# Patient Record
Sex: MALE | Employment: OTHER | URBAN - METROPOLITAN AREA
[De-identification: names, ages, dates, MRNs, and addresses within clinical notes are randomized per-mention and may not be internally consistent; named-entity substitution may affect disease eponyms.]

---

## 2017-06-22 ENCOUNTER — TELEPHONE (OUTPATIENT)
Dept: ORTHOPEDICS | Facility: CLINIC | Age: 52
End: 2017-06-22

## 2017-06-22 DIAGNOSIS — M21.962 DEFORMITY, FOOT, LEFT: Primary | ICD-10-CM

## 2017-06-22 NOTE — TELEPHONE ENCOUNTER
Erin lives in [place], has brother Jose who lives in [place] who is helping to schedule an appointment and possible surgery with Dr Castaneda.  Images were reviewed by Dr Castaneda and a surgery worksheet completed.  Jose asked that we provide an estimate of the cost for the surgery and all follow ups since it will be self pay.  Financial counselor was given a list of estimated orders, appts, surgery, and hospital days needed. Pt would like to schedule the clinic appt close to a surgery date for one travel time to the [place].

## 2017-07-17 ENCOUNTER — TELEPHONE (OUTPATIENT)
Dept: ORTHOPEDICS | Facility: CLINIC | Age: 52
End: 2017-07-17

## 2017-07-17 NOTE — TELEPHONE ENCOUNTER
[facility]:  Nursing Note  SITUATION                                                      Erin Manriquez is a 51 year old male whose brother called with confirmation of apt date/time.    BACKGROUND                                                        Patient is scheduled for surgery 10.26.17.    Date of last clinic appointment: N/A - patient is new to clinic    Does patient have a future appointment scheduled?  No      ASSESSMENT      Assessment not needed, apt date/time clarification    PLAN                                                      Nursing Interventions: Call transferred to RE RN.   RECOMMENDED DISPOSITION: None  Will comply with recommendation: N/A    If further questions/concerns or if symptoms do not improve, worsen or new symptoms develop, patient/family are advised to call [phone], option #3 to speak with a triage nurse, as soon as possible.    Radha Avendano

## 2017-10-23 ENCOUNTER — OFFICE VISIT (OUTPATIENT)
Dept: ORTHOPEDICS | Facility: CLINIC | Age: 52
End: 2017-10-23

## 2017-10-23 ENCOUNTER — ANESTHESIA EVENT (OUTPATIENT)
Dept: SURGERY | Facility: CLINIC | Age: 52
End: 2017-10-23

## 2017-10-23 VITALS — HEIGHT: 67 IN | BODY MASS INDEX: 33.59 KG/M2 | WEIGHT: 214 LBS

## 2017-10-23 DIAGNOSIS — G14 POST-POLIO SYNDROME (H): Primary | ICD-10-CM

## 2017-10-23 NOTE — LETTER
Date:October 24, 2017      Patient was self referred, no letter generated. Do not send.        [facility] Health Information

## 2017-10-23 NOTE — PROGRESS NOTES
"Orthopedic Surgery New Patient Clinic Visit    History of Present Illness:     History obtained from: Patient  Chief Complaint: Left foot pain/deformity  HPI: Erin Pardo is a 51 year old male who presents left foot pain and deformity since he was a child.  He reports having a history significant for polio, which has resulted in left lower extremity weakness.  His right lower extremity is normal otherwise.  He report pain with walking over the lateral foot, as well as progressive deformity to the left foot over the past several years. He has tried multiple orthotics and custom shoes, which he feels worsen his symptoms rather than help.  He notes increased pain with walking and activity.  He has had increasing difficulty with walking with the deformity and is here for evaluation of options.       Past Medical History: Polio as a child and left lower extremity weakness    Past Surgical History: Left calcaneal osteotomy, right knee epiphyseodesis    No Known Allergies     Social History     Social History     Marital status:      Spouse name: N/A     Number of children: N/A     Years of education: N/A     Occupational History     Not on file.     Social History Main Topics     Smoking status: Not on file     Smokeless tobacco: Never Used     Alcohol use Not on file     Drug use: Not on file     Sexual activity: Not on file     Other Topics Concern     Not on file     Social History Narrative     No narrative on file       family history is not on file.    Review of System:  a 14 point review of system is negative except as noted below    Physical Examination:     Vitals (last filed):   Ht 1.702 m (5' 7\")  Wt 97.1 kg (214 lb)  BMI 33.52 kg/m2    BMI:  Body mass index is 33.52 kg/(m^2).    Exam:  The patient is here today with with his wife    PSYCH: Alert and oriented, cooperative, pleasant, appropriate  EYES: normal symmetric extraocular movement  HEENT: normocephalic, atraumatic, oropharynx pink and " moist  NECK: no jvd  RESPIRATORY: breathing comfortably on room air, no labored breathing  CARDIAC: RRR  SKIN: warm and well perfused, no abnormalities noted  LYMPH: no lad to the left popliteal fossa  NEURO:   L LE: SILT in DP/SP/Tib/Saph/Ambreen distributions  VASC:   Palpable dp/pt pulse  MSK:  LLE: cavovarus deformity to the left foot  Callosity over the base of the 5th mt  Correction of the hindfoot with judd block test  Markedly plantarflexed 1st ray    4/5 EHL/TA/GSC  4/5 eversion/inversion  1/5 hamstring/quad      Laboratory / Ancillary Data:     Data Review:  No results found for: WBC, HGB, HCT, MCV, PLT, RDW  No results found for: GLUCOSE, GLUF, CREAT, BUN, NA, CO2, ANIONGAP  No results found for: INR      Imaging:   XR bilateral feet -   Evidence of prior calcaneal osteotomy of the left foot, profound cavovarus deformity of the left foot noted, apparent preservation of tibiotalar/subtalar joint space     Assessment / Plan:     Active Problems:    * No active hospital problems. *      51 year old male with cavovarus left foot.    -discussed with patient diagnosis and treatment modalities including activity modification, medication, bracing, and surgical corrrection.  Patient understands the risks of surgery including infection, damage to nerves and vessels, and requiement of revision surgery in the future and would like to proceed with surgery on Thursday.    Plan:    Plantar fascia release, split posterior tibial tendon transfer, triple arthrodesis with midfoot osteotomy    Patient seen and examined with Dr Castaneda who agrees with the above assessment and plan of care.      Signed:  Cody Sheehan  10/23/2017  3:58 PM      I agree with the resident physician and the plan outlined, and saw the patient.  Anshul Castaneda

## 2017-10-23 NOTE — MR AVS SNAPSHOT
After Visit Summary   10/23/2017    Erin Pardo    MRN: [id number]           Patient Information     Date Of Birth          1965        Visit Information        Provider Department      10/23/2017 1:00 PM Anshul Castaneda MD [facility]        Today's Diagnoses     Post-polio syndrome    -  1       Follow-ups after your visit        Your next 10 appointments already scheduled     Oct 24, 2017  9:30 AM CDT   (Arrive by 9:15 AM)   PAC EVALUATION with  Pac Edwin 2   [facility] Preoperative Assessment [place] ([facility])    [address] [place] [address]   [phone]            Oct 24, 2017 10:30 AM CDT   (Arrive by 10:15 AM)   PAC RN ASSESSMENT with  Pac Rn   [facility] Assessment [place] ([facility])    [address] [place] [address]   [phone]            Oct 24, 2017 11:00 AM CDT   (Arrive by 10:45 AM)   PAC Anesthesia Consult with  Pac Anesthesiologist   [facility] ([facility])    [address] [place] [address]   [phone]            Oct 26, 2017   Procedure with Anshul [place]eda MD   [facility], [place], Same Day Surgery (--)    [address] [facility] [address] [phone]            Nov 09, 2017 10:00 AM CST   (Arrive by 9:45 AM)   Post-Op with MERE Whittington CNP   [facility] ([facility])    [address] [place] [id number] [phone]            Dec 11, 2017  3:45 PM CST   (Arrive by 3:30 PM)   Return Visit with Anshul [place] MD   [facility] ([facility])    [address] [place] [id number] [phone]              Who to contact     Please call your clinic at [phone] to:    Ask questions about your  "health    Make or cancel appointments    Discuss your medicines    Learn about your test results    Speak to your doctor   If you have compliments or concerns about an experience at your clinic, or if you wish to file a complaint, please contact [facility] Patient Relations at [phone] or email us at [email or web address]         Additional Information About Your Visit        [employer] Information     [employer] is an electronic gateway that provides easy, online access to your medical records. With [employer], you can request a clinic appointment, read your test results, renew a prescription or communicate with your care team.     To sign up for [employer] visit the website at [email or web address]   You will be asked to enter the access code listed below, as well as some personal information. Please follow the directions to create your username and password.     Your access code is: [email or web address]  Expires: 2018  4:27 PM     Your access code will  in 90 days. If you need help or a new code, please contact your [facility] or call [phone] for assistance.        Care EveryWhere ID     This is your Care EveryWhere ID. This could be used by other organizations to access your [place] medical records  [id number]        Your Vitals Were     Height BMI (Body Mass Index)                1.702 m (5' 7\") 33.52 kg/m2           Blood Pressure from Last 3 Encounters:   No data found for BP    Weight from Last 3 Encounters:   10/23/17 97.1 kg (214 lb)              Today, you had the following     No orders found for display       Primary Care Provider    None Specified       No primary provider on file.        Equal Access to Services     [facility]: Etienne Champion, rodri eason, qamarlen fletcher . [facility] [phone].    ATENCIÓN: Si habla español, tiene a hollis disposición " servicios gratuitos de asistencia lingüística. Gutierrez cruz [phone].    We comply with applicable federal civil rights laws and [place] laws. We do not discriminate on the basis of race, color, national origin, age, disability, sex, sexual orientation, or gender identity.            Thank you!     Thank you for choosing [facility]  for your care. Our goal is always to provide you with excellent care. Hearing back from our patients is one way we can continue to improve our services. Please take a few minutes to complete the written survey that you may receive in the mail after your visit with us. Thank you!             Your Updated Medication List - Protect others around you: Learn how to safely use, store and throw away your medicines at www.disposemymeds.org.      Notice  As of 10/23/2017  4:27 PM    You have not been prescribed any medications.

## 2017-10-23 NOTE — LETTER
"10/23/2017       RE: Erin Pardo  [address] [facility] [address]     Dear Colleague,    Thank you for referring your patient, Erin Pardo, to the [facility] ORTHOP[facility]IC at [facility]. Please see a copy of my visit note below.    Orthopedic Surgery New Patient Clinic Visit    History of Present Illness:     History obtained from: Patient  Chief Complaint: Left foot pain/deformity  HPI: Erin Pardo is a 51 year old male who presents left foot pain and deformity since he was a child.  He reports having a history significant for polio, which has resulted in left lower extremity weakness.  His right lower extremity is normal otherwise.  He report pain with walking over the lateral foot, as well as progressive deformity to the left foot over the past several years. He has tried multiple orthotics and custom shoes, which he feels worsen his symptoms rather than help.  He notes increased pain with walking and activity.  He has had increasing difficulty with walking with the deformity and is here for evaluation of options.       Past Medical History: Polio as a child and left lower extremity weakness    Past Surgical History: Left calcaneal osteotomy, right knee epiphyseodesis    No Known Allergies     Social History     Social History     Marital status:      Spouse name: N/A     Number of children: N/A     Years of education: N/A     Occupational History     Not on file.     Social History Main Topics     Smoking status: Not on file     Smokeless tobacco: Never Used     Alcohol use Not on file     Drug use: Not on file     Sexual activity: Not on file     Other Topics Concern     Not on file     Social History Narrative     No narrative on file       family history is not on file.    Review of System:  a 14 point review of system is negative except as noted below    Physical Examination:     Vitals (last filed):   Ht 1.702 m (5' 7\")  Wt 97.1 kg " (214 lb)  BMI 33.52 kg/m2    BMI:  Body mass index is 33.52 kg/(m^2).    Exam:  The patient is here today with with his wife    PSYCH: Alert and oriented, cooperative, pleasant, appropriate  EYES: normal symmetric extraocular movement  HEENT: normocephalic, atraumatic, oropharynx pink and moist  NECK: no jvd  RESPIRATORY: breathing comfortably on room air, no labored breathing  CARDIAC: RRR  SKIN: warm and well perfused, no abnormalities noted  LYMPH: no lad to the left popliteal fossa  NEURO:   L LE: SILT in DP/SP/Tib/Saph/Ambreen distributions  VASC:   Palpable dp/pt pulse  MSK:  LLE: cavovarus deformity to the left foot  Callosity over the base of the 5th mt  Correction of the hindfoot with judd block test  Markedly plantarflexed 1st ray    4/5 EHL/TA/GSC  4/5 eversion/inversion  1/5 hamstring/quad      Laboratory / Ancillary Data:     Data Review:  No results found for: WBC, HGB, HCT, MCV, PLT, RDW  No results found for: GLUCOSE, GLUF, CREAT, BUN, NA, CO2, ANIONGAP  No results found for: INR      Imaging:   XR bilateral feet -   Evidence of prior calcaneal osteotomy of the left foot, profound cavovarus deformity of the left foot noted, apparent preservation of tibiotalar/subtalar joint space     Assessment / Plan:     Active Problems:    * No active hospital problems. *      51 year old male with cavovarus left foot.    -discussed with patient diagnosis and treatment modalities including activity modification, medication, bracing, and surgical corrrection.  Patient understands the risks of surgery including infection, damage to nerves and vessels, and requiement of revision surgery in the future and would like to proceed with surgery on Thursday.    Plan:    Plantar fascia release, split posterior tibial tendon transfer, triple arthrodesis with midfoot osteotomy    Patient seen and examined with Dr Castaneda who agrees with the above assessment and plan of care.      Signed:  Cody Sheehan  10/23/2017  3:58  PM      I agree with the resident physician and the plan outlined, and saw the patient.  Anshul Castaneda      Again, thank you for allowing me to participate in the care of your patient.      Sincerely,    Anshul Castaneda MD

## 2017-10-23 NOTE — NURSING NOTE
Reason For Visit:   Chief Complaint   Patient presents with     Musculoskeletal Problem     Pre op discussion for 10/26/17           Pain Assessment  Patient Currently in Pain: Denies (muscle soreness/fatigue from walking the last few days)

## 2017-10-24 ENCOUNTER — ALLIED HEALTH/NURSE VISIT (OUTPATIENT)
Dept: SURGERY | Facility: CLINIC | Age: 52
End: 2017-10-24

## 2017-10-24 ENCOUNTER — OFFICE VISIT (OUTPATIENT)
Dept: SURGERY | Facility: CLINIC | Age: 52
End: 2017-10-24

## 2017-10-24 VITALS
HEART RATE: 67 BPM | DIASTOLIC BLOOD PRESSURE: 96 MMHG | OXYGEN SATURATION: 97 % | BODY MASS INDEX: 33.89 KG/M2 | WEIGHT: 215.9 LBS | TEMPERATURE: 98 F | RESPIRATION RATE: 16 BRPM | SYSTOLIC BLOOD PRESSURE: 156 MMHG | HEIGHT: 67 IN

## 2017-10-24 DIAGNOSIS — M21.962 TIBIAL DEFORMITY, ACQUIRED, LEFT: Primary | ICD-10-CM

## 2017-10-24 DIAGNOSIS — M21.962 TIBIAL DEFORMITY, ACQUIRED, LEFT: ICD-10-CM

## 2017-10-24 DIAGNOSIS — I10 HYPERTENSION, UNSPECIFIED TYPE: ICD-10-CM

## 2017-10-24 LAB
ANION GAP SERPL CALCULATED.3IONS-SCNC: 4 MMOL/L (ref 3–14)
BUN SERPL-MCNC: 14 MG/DL (ref 7–30)
CALCIUM SERPL-MCNC: 8.7 MG/DL (ref 8.5–10.1)
CHLORIDE SERPL-SCNC: 105 MMOL/L (ref 94–109)
CO2 SERPL-SCNC: 30 MMOL/L (ref 20–32)
CREAT SERPL-MCNC: 0.71 MG/DL (ref 0.66–1.25)
ERYTHROCYTE [DISTWIDTH] IN BLOOD BY AUTOMATED COUNT: 13.2 % (ref 10–15)
GFR SERPL CREATININE-BSD FRML MDRD: >90 ML/MIN/1.7M2
GLUCOSE SERPL-MCNC: 122 MG/DL (ref 70–99)
HCT VFR BLD AUTO: 48.2 % (ref 40–53)
HGB BLD-MCNC: 15.7 G/DL (ref 13.3–17.7)
MCH RBC QN AUTO: 27.7 PG (ref 26.5–33)
MCHC RBC AUTO-ENTMCNC: 32.6 G/DL (ref 31.5–36.5)
MCV RBC AUTO: 85 FL (ref 78–100)
PLATELET # BLD AUTO: 212 10E9/L (ref 150–450)
POTASSIUM SERPL-SCNC: 4.5 MMOL/L (ref 3.4–5.3)
RBC # BLD AUTO: 5.66 10E12/L (ref 4.4–5.9)
SODIUM SERPL-SCNC: 139 MMOL/L (ref 133–144)
WBC # BLD AUTO: 7.1 10E9/L (ref 4–11)

## 2017-10-24 ASSESSMENT — LIFESTYLE VARIABLES: TOBACCO_USE: 0

## 2017-10-24 NOTE — PATIENT INSTRUCTIONS
Preparing for Your Surgery      Name:  Erin Pardo   MRN:  [id number]   :  1965   Today's Date:  10/24/2017     Arriving for surgery:  Surgery date:  10/26/17  Arrival time:  06:30  Please come to:     [facility] [address]  [address] [place]  [address]    - parking is available in front of [facility] from 5:15AM to 8:00PM. If you prefer, park your car in the Green Lot.    -Proceed to the 3rd floor, check in at the Adult Surgery Waiting Lounge. [phone]    If an escort is needed stop at the Information Desk in the lobby. Inform the information person that you are here for surgery. An escort to the Adult Surgery Waiting Lounge will be provided.        What can I eat or drink?  -  You may have solid food or milk products until 8 hours prior to your surgery.  -  You may have water, apple juice or 7up/Sprite until 2 hours prior to your surgery.    Which medicines can I take?  -  Do NOT take these medications in the morning, the day of surgery:  Aspirin and supplements x 7 days before surgery, ibuprofen x 2 days before surgery    -  Please take these medications the day of surgery:  Tylenol if needed    How do I prepare myself?  -  Take two showers: one the night before surgery; and one the morning of surgery.         Use Scrubcare or Hibiclens to wash from neck down.  You may use your own shampoo and conditioner. No other hair products.   -  Do NOT use lotion, powder, deodorant, or antiperspirant the day of your surgery.  -  Do NOT wear any makeup, fingernail polish or jewelry.  -  Begin using Incentive Spirometer 1 week prior to surgery.  Use 4 times per day, up to 5 breaths each time.  Bring Incentive Spirometer to hospital.  -Do not bring your own medications to the hospital, except for inhalers and eye drops.  -  Bring your ID and insurance card.    Questions or Concerns:  If you have questions or concerns, please call  the  Preoperative Assessment Center, Monday-Friday 7AM-7PM:  [phone]

## 2017-10-24 NOTE — ANESTHESIA PREPROCEDURE EVALUATION
"  Anesthesia Evaluation     . Pt has had prior anesthetic. Type: General    No history of anesthetic complications          ROS/MED HX    ENT/Pulmonary:     (+)ADAM risk factors snores loudly, , . .   (-) tobacco use   Neurologic:  - neg neurologic ROS     Cardiovascular:  - neg cardiovascular ROS   (+) hypertension-range: \"borderline\", ---. : . . . :. . No previous cardiac testing      (-) taking anticoagulants/antiplatelets   METS/Exercise Tolerance: Comment: Snorkeling, diving, hunting and fishing.  Limited walking by left foot pain/movement >4 METS   Hematologic:  - neg hematologic  ROS       Musculoskeletal:   (+) , , other musculoskeletal (hx of polio.  LLE pain and deformity 2* polio)-       GI/Hepatic:  - neg GI/hepatic ROS      (-) GERD   Renal/Genitourinary:  - ROS Renal section negative       Endo:  - neg endo ROS   (+) Obesity, .      Psychiatric:  - neg psychiatric ROS       Infectious Disease:  - neg infectious disease ROS       Malignancy:      - no malignancy   Other:    (+) no H/O Chronic Pain,                   Physical Exam      Airway   Mallampati: II  TM distance: >3 FB  Neck ROM: full    Dental   Comment: Permanent bridge lower    Cardiovascular   Rhythm and rate: regular and normal      Pulmonary    breath sounds clear to auscultation    Other findings: LLE muscle atrophy      10/24/2017 10:50  Sodium: 139  Potassium: 4.5  Chloride: 105  Carbon Dioxide: 30  Urea Nitrogen: 14  Creatinine: 0.71  GFR Estimate: >90  GFR Estimate If Black: >90  Calcium: 8.7  Anion Gap: 4    Glucose: 122 (H)    WBC: 7.1  Hemoglobin: 15.7  Hematocrit: 48.2  Platelet Count: 212  RBC Count: 5.66  MCV: 85  MCH: 27.7  MCHC: 32.6  RDW: 13.2           PAC Discussion and Assessment    ASA Classification: 2  Case is suitable for: [place]  Anesthetic techniques and relevant risks discussed: Regional  Invasive monitoring and risk discussed: No  Types:   Possibility and Risk of blood transfusion discussed: " No  NPO instructions given:   Additional anesthetic preparation and risks discussed:   Needs early admission to pre-op area:   Other:     PAC Resident/NP Anesthesia Assessment:  Scheduled for Left Tibia Internal Fixation, Left Triple Arthrodesis, Left Posterior Tibia Tendon Transfer, Left Achilles Lengthening on 10/26/17 by Dr. Castaneda in treatment of post polio LLE pain/deformity.  PAC referral for risk assessment and optimization for anesthesia with comorbid conditions of:    Erin Pardo is a 51 year old male with left foot pain and deformity since he was a child.  He reports having a history significant for polio, which has resulted in left lower extremity weakness.  His right lower extremity is normal otherwise.     Pre-operative considerations:  1.  Cardiac:  Functional status very good.  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  0.4%  risk of major adverse cardiac event.    2.  Pulm:  Airway feasible.  ADAM risk:  intermediate  3.  GI:  Risk of PONV score = 2.  If 3 or > anti-emetic intervention recommended (with 2 or more meds).   4.  History of Polio with LLE deformity/atrophy  5.  ID:  Travelled from [facility].  No s/s of MERS.        Patient is optimized and is acceptable candidate for the proposed procedure.  No further diagnostic evaluation is needed.     Patient also evaluated by Dr. Turner. See recommendations below.           Reviewed and Signed by PAC Mid-Level Provider/Resident  Mid-Level Provider/Resident: Shelby Rajan PA-C  Date: 10/24/17  Time: 0940    Attending Anesthesiologist Anesthesia Assessment:  I saw the patient and discussed with the LINDA as above.  Patient currently medically optimized for the proposed procedure.   Final anesthetic plan and recommendations to be made by the attending anesthesiologist on the day of surgery.       Reviewed and Signed by PAC Anesthesiologist  Anesthesiologist: VERONICA  Date: 10/24/17  Time:   Pass/Fail: Pass  Disposition:     PAC Pharmacist  Assessment:        Pharmacist:   Date:   Time:      Anesthesia Plan      History & Physical Review  History and physical reviewed and following examination; no interval change.    ASA Status:  3 .    NPO Status:  > 8 hours    Plan for Spinal and Periph. Nerve Block for postop pain with Propofol induction.   PONV prophylaxis:  Ondansetron (or other 5HT-3)  Popliteal block for post op pain  Spinal for the case        Postoperative Care  Postoperative pain management:  Multi-modal analgesia, Peripheral nerve block (Single Shot) and Neuraxial analgesia.      Consents  Anesthetic plan, risks, benefits and alternatives discussed with:  Patient.  Use of blood products discussed: Yes.   Use of blood products discussed with Patient.  Consented to blood products.  .                          .

## 2017-10-24 NOTE — H&P
Pre-Operative H & P     CC:  Preoperative exam to assess for increased cardiopulmonary risk while undergoing surgery and anesthesia.    Date of Encounter: 10/24/2017  Primary Care Physician:  No primary care provider on file.  (in [place])    CHESTER Pardo is a 51 year old male who presents for pre-operative H & P in preparation for  Left Tibia Internal Fixation, Left Triple Arthrodesis, Left Posterior Tibia Tendon Transfer, Left Achilles Lengthening on 10/26/17 by Dr. Castaneda in treatment of post polio LLE pain/deformity.  Surgery at [facility].     History of polio as a child.  Has left foot pain and deformity that limits activity.  He reports weight gain due to inactivity.  No personal or family history of bleeding, clotting disorders or complications with anesthesia.    He lives and works in [place].  No s/s of MERS.     History is obtained from the patient and electronic health record.     Past Medical History  Past Medical History:   Diagnosis Date     Obesity (BMI 30.0-34.9)      Post-polio limb muscle weakness     left       Past Surgical History  Past Surgical History:   Procedure Laterality Date     OSTEOTOMY ANKLE  1978    and right knee at [facility]       Hx of Blood transfusions/reactions: no     Hx of abnormal bleeding or anti-platelet use: no    Steroid use in the last year: no    Personal or FH with difficulty with Anesthesia:  no    Prior to Admission Medications  No current outpatient prescriptions on file.       Allergies  No Known Allergies    Social History  Social History     Social History     Marital status:      Spouse name: N/A     Number of children: N/A     Years of education: N/A     Occupational History     Not on file.     Social History Main Topics     Smoking status: Never Smoker     Smokeless tobacco: Never Used     Alcohol use No     Drug use: No     Sexual activity: Not on file     Other Topics Concern  "    Not on file     Social History Narrative    .  Lives and works in [place] [facility].    In Marine business - dives and fishes    5 children - healthy    18 siblings (some 1/2 siblings)    + FH of cancer in father's side.        Family History  See above.       ROS/MED HX  The complete review of systems is negative other than noted in the HPI or here.     ENT/Pulmonary:     (+)ADAM risk factors snores loudly, , . .   (-) tobacco use   Neurologic:  - neg neurologic ROS     Cardiovascular:  - neg cardiovascular ROS   (+) hypertension-range: \"borderline\", ---. : . . . :. . No previous cardiac testing      (-) taking anticoagulants/antiplatelets   METS/Exercise Tolerance: Comment: Snorkeling, diving, hunting and fishing.  Limited walking by left foot pain >4 METS   Hematologic:  - neg hematologic  ROS       Musculoskeletal:   (+) , , other musculoskeletal (hx of polio.  LLE pain and deformity 2* polio)-       GI/Hepatic:  - neg GI/hepatic ROS      (-) GERD   Renal/Genitourinary:  - ROS Renal section negative       Endo:  - neg endo ROS   (+) Obesity, .      Psychiatric:  - neg psychiatric ROS       Infectious Disease:  - neg infectious disease ROS       Malignancy:      - no malignancy   Other:    (+) no H/O Chronic Pain,       Temp: 98  F (36.7  C) Temp src: Oral BP: (!) 156/96 Pulse: 67   Resp: 16 SpO2: 97 %         215 lbs 14.4 oz  5' 7\"   Body mass index is 33.81 kg/(m^2).       Physical Exam  Constitutional: Awake, alert, cooperative, no apparent distress, and appears stated age.  Eyes: Pupils equal, round and reactive to light, sclera clear, conjunctiva normal.  HENT: Normocephalic, oral pharynx with moist mucus membranes, good dentition. No goiter appreciated.   Respiratory: Clear to auscultation bilaterally, no crackles or wheezing.  Cardiovascular: Regular rate and rhythm, normal S1 and S2, and no murmur noted.  Carotids +2, no bruits. No edema. Palpable pulses to  DP and PT arteries.   GI: Normal bowel " sounds, soft, non-distended, non-tender, no masses palpated, no hepatosplenomegaly.    Lymph/Hematologic: No cervical lymphadenopathy and no supraclavicular lymphadenopathy.  Skin: Warm and dry.  No rashes at anticipated surgical site.   Musculoskeletal: Full ROM of neck. There is no redness, warmth, or swelling of the joints. Gross motor strength is normal.    Neurologic: Awake, alert, oriented to name, place and time. Cranial nerves II-XII are grossly intact. Gait is normal.   Neuropsychiatric: Calm, cooperative. Normal affect.     Labs: (personally reviewed)      10/24/2017 10:50  Sodium: 139  Potassium: 4.5  Chloride: 105  Carbon Dioxide: 30  Urea Nitrogen: 14  Creatinine: 0.71  GFR Estimate: >90  GFR Estimate If Black: >90  Calcium: 8.7  Anion Gap: 4    Glucose: 122 (H)    WBC: 7.1  Hemoglobin: 15.7  Hematocrit: 48.2  Platelet Count: 212  RBC Count: 5.66  MCV: 85  MCH: 27.7  MCHC: 32.6  RDW: 13.2      Outside records reviewed from: NA    ASSESSMENT and PLAN  Erin Pardo is a 51 year old male scheduled to undergo  Left Tibia Internal Fixation, Left Triple Arthrodesis, Left Posterior Tibia Tendon Transfer, Left Achilles Lengthening on 10/26/17 by Dr. Castaneda in treatment of post polio LLE pain/deformity.  PAC referral for risk assessment and optimization for anesthesia with comorbid conditions of:    Erin Pardo is a 51 year old male with left foot pain and deformity since he was a child.  He reports having a history significant for polio, which has resulted in left lower extremity weakness.  His right lower extremity is normal otherwise.     Pre-operative considerations:  1.  Cardiac:  Functional status very good.  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  0.4%  risk of major adverse cardiac event.    2.  Pulm:  Airway feasible.  ADAM risk:  Intermediate. Non smoker.   3.  GI:  Risk of PONV score = 2.  If 3 or > anti-emetic intervention recommended (with 2 or more meds).   4.  History of Polio with LLE  deformity/atrophy  5.  ID:  Travelled from [place].  No s/s of MERS.    6.  Obese - BMI 34.  Has been counseled on diet and exercise.      Labs ordered:   CBC, BMP  For complete medication and diet instructions for surgery, please refer to the AVS completed by nursing.   Patient is optimized and is acceptable candidate for the proposed procedure.  No further diagnostic evaluation is needed.  Patient was discussed with Dr Turner.    Shelby Rajan PA-C  Preoperative Assessment Center  [facility]  Clinic and Surgery Center  Phone: [phone]  Fax: [phone]

## 2017-10-24 NOTE — MR AVS SNAPSHOT
After Visit Summary   10/24/2017    Erin Pardo    MRN: [id number]           Patient Information     Date Of Birth          1965        Visit Information        Provider Department      10/24/2017 10:30 AM Rn, [facility]        Care Instructions    Preparing for Your Surgery      Name:  Erin Pardo   MRN:  [id number]   :  1965   Today's Date:  10/24/2017     Arriving for surgery:  Surgery date:  10/26/17  Arrival time:  06:30  Please come to:     [facility] [address]  [address] [place], [place] [address]  [address]    - parking is available in front of [facility] from 5:15AM to 8:00PM. If you prefer, park your car in the Green Lot.    -Proceed to the 3rd floor, check in at the Adult Surgery Waiting Lounge. [phone]    If an escort is needed stop at the Information Desk in the lobby. Inform the information person that you are here for surgery. An escort to the Adult Surgery Waiting Lounge will be provided.        What can I eat or drink?  -  You may have solid food or milk products until 8 hours prior to your surgery.  -  You may have water, apple juice or 7up/Sprite until 2 hours prior to your surgery.    Which medicines can I take?  -  Do NOT take these medications in the morning, the day of surgery:  Aspirin and supplements x 7 days before surgery, ibuprofen x 2 days before surgery    -  Please take these medications the day of surgery:  Tylenol if needed    How do I prepare myself?  -  Take two showers: one the night before surgery; and one the morning of surgery.         Use Scrubcare or Hibiclens to wash from neck down.  You may use your own shampoo and conditioner. No other hair products.   -  Do NOT use lotion, powder, deodorant, or antiperspirant the day of your surgery.  -  Do NOT wear any makeup, fingernail polish or jewelry.  -  Begin using Incentive Spirometer 1 week prior to  surgery.  Use 4 times per day, up to 5 breaths each time.  Bring Incentive Spirometer to hospital.  -Do not bring your own medications to the hospital, except for inhalers and eye drops.  -  Bring your ID and insurance card.    Questions or Concerns:  If you have questions or concerns, please call the  Preoperative Assessment Center, Monday-Friday 7AM-7PM:  [phone]                    Follow-ups after your visit        Your next 10 appointments already scheduled     Oct 24, 2017 10:30 AM CDT   (Arrive by 10:15 AM)   PAC RN ASSESSMENT with  Pac Rn   [facility] Preoperative [facility] ([facility])    [address] [place] [address]  4th [facility] [address] [phone]            Oct 24, 2017 11:00 AM CDT   (Arrive by 10:45 AM)   PAC Anesthesia Consult with  Pac Anesthesiologist   [facility] Preoperative Assessment [place] ([facility])    [address] [place] [address]  4th [facility] [id number] [phone]            Oct 24, 2017 11:15 AM CDT   LAB with [facility]   [facility] ([facility])    [address] [place] [id number] [phone]           Patient must bring picture ID. Patient should be prepared to give a urine specimen  Please do not eat 10-12 hours before your appointment if you are coming in fasting for labs on lipids, cholesterol, or glucose (sugar). Pregnant women should follow their Care Team instructions. Water with medications is okay. Do not drink coffee or other fluids. If you have concerns about taking  your medications, please ask at office or if scheduling via [employer], send a message by clicking on Secure Messaging, Message Your Care Team.            Oct 26, 2017   Procedure with Anshul Castaneda MD   [facility], [place], Same Day Surgery (--[facility]    [address] [facility] [place] [id number] [phone]            Nov 09, 2017 10:00 AM CST   (Arrive by 9:45 AM)   Post-Op with Kenia ROMAN  Vaske, APRN CNP   [facility] Orthopaedic [facility] ([facility])    [address] [facility]  4th [facility] [address] [phone]            Dec 11, 2017  3:45 PM CST   (Arrive by 3:30 PM)   Return Visit with Anshul Castaneda MD   [facility] ([facility])    [address]  4th [facility] [address] [phone]              Who to contact     Please call your clinic at [phone] to:    Ask questions about your health    Make or cancel appointments    Discuss your medicines    Learn about your test results    Speak to your doctor   If you have compliments or concerns about an experience at your clinic, or if you wish to file a complaint, please contact [facility] Patient Relations at [phone] or email us at [email or web address]         Additional Information About Your Visit        MyChart Information     Zero9t is an electronic gateway that provides easy, online access to your medical records. With Selphee, you can request a clinic appointment, read your test results, renew a prescription or communicate with your care team.     To sign up for Selphee visit the website at [email or web address]   You will be asked to enter the access code listed below, as well as some personal information. Please follow the directions to create your username and password.     Your access code is: [email or web address]  Expires: 2018  4:27 PM     Your access code will  in 90 days. If you need help or a new code, please contact your [facility] or call [phone] for assistance.        Care EveryWhere ID     This is your Care EveryWhere ID. This could be used by other organizations to access your [facility] medical records  [id number]         Blood Pressure from Last 3 Encounters:   10/24/17 (!) 156/96    Weight from Last 3 Encounters:   10/24/17 97.9 kg (215  lb 14.4 oz)   10/23/17 97.1 kg (214 lb)              Today, you had the following     No orders found for display       Primary Care Provider    None Specified       No primary provider on file.        Equal Access to Services     BLADE VILLANUEVA : Hadii aad ku hadmoojustine Champion, michelleda bhaktigregoriaha, obduliata kamida renee, marlen mimi navcatracho ericmaríabrie connelly. So [facility] [phone].    ATENCIÓN: Si habla español, tiene a hollis disposición servicios gratuitos de asistencia lingüística. Llame al [phone].    We comply with applicable federal civil rights laws and [place] laws. We do not discriminate on the basis of race, color, national origin, age, disability, sex, sexual orientation, or gender identity.            Thank you!     Thank you for choosing [facility] PREOPERATIVE [facility]  for your care. Our goal is always to provide you with excellent care. Hearing back from our patients is one way we can continue to improve our services. Please take a few minutes to complete the written survey that you may receive in the mail after your visit with us. Thank you!             Your Updated Medication List - Protect others around you: Learn how to safely use, store and throw away your medicines at www.disposemymeds.org.      Notice  As of 10/24/2017  9:53 AM    You have not been prescribed any medications.

## 2017-10-26 ENCOUNTER — APPOINTMENT (OUTPATIENT)
Dept: GENERAL RADIOLOGY | Facility: CLINIC | Age: 52
End: 2017-10-26
Attending: ORTHOPAEDIC SURGERY

## 2017-10-26 ENCOUNTER — HOSPITAL ENCOUNTER (OUTPATIENT)
Facility: CLINIC | Age: 52
Discharge: HOME | End: 2017-10-27
Attending: ORTHOPAEDIC SURGERY | Admitting: ORTHOPAEDIC SURGERY

## 2017-10-26 ENCOUNTER — ANESTHESIA (OUTPATIENT)
Dept: SURGERY | Facility: CLINIC | Age: 52
End: 2017-10-26

## 2017-10-26 DIAGNOSIS — Z98.890 STATUS POST OSTEOTOMY: Primary | ICD-10-CM

## 2017-10-26 LAB — GLUCOSE BLDC GLUCOMTR-MCNC: 125 MG/DL (ref 70–99)

## 2017-10-26 PROCEDURE — 36000064 ZZH SURGERY LEVEL 4 EA 15 ADDTL MIN - UMMC: Performed by: ORTHOPAEDIC SURGERY

## 2017-10-26 PROCEDURE — 40000986 XR ANKLE PORT LT 2 VW: Mod: LT

## 2017-10-26 PROCEDURE — C9290 INJ, BUPIVACAINE LIPOSOME: HCPCS | Performed by: ANESTHESIOLOGY

## 2017-10-26 PROCEDURE — 25000128 H RX IP 250 OP 636: Performed by: ANESTHESIOLOGY

## 2017-10-26 PROCEDURE — 37000008 ZZH ANESTHESIA TECHNICAL FEE, 1ST 30 MIN: Performed by: ORTHOPAEDIC SURGERY

## 2017-10-26 PROCEDURE — 25000132 ZZH RX MED GY IP 250 OP 250 PS 637: Performed by: ANESTHESIOLOGY

## 2017-10-26 PROCEDURE — 25000128 H RX IP 250 OP 636: Performed by: ORTHOPAEDIC SURGERY

## 2017-10-26 PROCEDURE — 40000278 XR SURGERY CARM FLUORO LESS THAN 5 MIN: Mod: TC

## 2017-10-26 PROCEDURE — 40000170 ZZH STATISTIC PRE-PROCEDURE ASSESSMENT II: Performed by: ORTHOPAEDIC SURGERY

## 2017-10-26 PROCEDURE — 71000015 ZZH RECOVERY PHASE 1 LEVEL 2 EA ADDTL HR: Performed by: ORTHOPAEDIC SURGERY

## 2017-10-26 PROCEDURE — 25000125 ZZHC RX 250: Performed by: NURSE ANESTHETIST, CERTIFIED REGISTERED

## 2017-10-26 PROCEDURE — C1713 ANCHOR/SCREW BN/BN,TIS/BN: HCPCS | Performed by: ORTHOPAEDIC SURGERY

## 2017-10-26 PROCEDURE — 71000014 ZZH RECOVERY PHASE 1 LEVEL 2 FIRST HR: Performed by: ORTHOPAEDIC SURGERY

## 2017-10-26 PROCEDURE — 99213 OFFICE O/P EST LOW 20 MIN: CPT | Performed by: INTERNAL MEDICINE

## 2017-10-26 PROCEDURE — 25000128 H RX IP 250 OP 636: Performed by: NURSE ANESTHETIST, CERTIFIED REGISTERED

## 2017-10-26 PROCEDURE — 25000125 ZZHC RX 250: Performed by: ANESTHESIOLOGY

## 2017-10-26 PROCEDURE — C1769 GUIDE WIRE: HCPCS | Performed by: ORTHOPAEDIC SURGERY

## 2017-10-26 PROCEDURE — 27210794 ZZH OR GENERAL SUPPLY STERILE: Performed by: ORTHOPAEDIC SURGERY

## 2017-10-26 PROCEDURE — 37000009 ZZH ANESTHESIA TECHNICAL FEE, EACH ADDTL 15 MIN: Performed by: ORTHOPAEDIC SURGERY

## 2017-10-26 PROCEDURE — 25000132 ZZH RX MED GY IP 250 OP 250 PS 637: Performed by: ORTHOPAEDIC SURGERY

## 2017-10-26 PROCEDURE — 12000001 ZZH R&B MED SURG/OB UMMC

## 2017-10-26 PROCEDURE — 36000066 ZZH SURGERY LEVEL 4 W FLUORO 1ST 30 MIN - UMMC: Performed by: ORTHOPAEDIC SURGERY

## 2017-10-26 PROCEDURE — 40000986 XR FOOT PORT LT 2 VW: Mod: LT

## 2017-10-26 PROCEDURE — 00000146 ZZHCL STATISTIC GLUCOSE BY METER IP

## 2017-10-26 DEVICE — IMPLANTABLE DEVICE: Type: IMPLANTABLE DEVICE | Site: FOOT | Status: FUNCTIONAL

## 2017-10-26 RX ORDER — DIPHENHYDRAMINE HCL 25 MG
25 CAPSULE ORAL EVERY 6 HOURS PRN
Status: DISCONTINUED | OUTPATIENT
Start: 2017-10-26 | End: 2017-10-27 | Stop reason: HOSPADM

## 2017-10-26 RX ORDER — FENTANYL CITRATE 50 UG/ML
25-50 INJECTION, SOLUTION INTRAMUSCULAR; INTRAVENOUS EVERY 5 MIN PRN
Status: DISCONTINUED | OUTPATIENT
Start: 2017-10-26 | End: 2017-10-26

## 2017-10-26 RX ORDER — FENTANYL CITRATE 50 UG/ML
INJECTION, SOLUTION INTRAMUSCULAR; INTRAVENOUS PRN
Status: DISCONTINUED | OUTPATIENT
Start: 2017-10-26 | End: 2017-10-26

## 2017-10-26 RX ORDER — ACETAMINOPHEN 325 MG/1
650 TABLET ORAL EVERY 4 HOURS PRN
Status: DISCONTINUED | OUTPATIENT
Start: 2017-10-29 | End: 2017-10-27 | Stop reason: HOSPADM

## 2017-10-26 RX ORDER — CEFAZOLIN SODIUM 2 G/100ML
2 INJECTION, SOLUTION INTRAVENOUS
Status: COMPLETED | OUTPATIENT
Start: 2017-10-26 | End: 2017-10-26

## 2017-10-26 RX ORDER — ONDANSETRON 4 MG/1
4 TABLET, ORALLY DISINTEGRATING ORAL EVERY 30 MIN PRN
Status: DISCONTINUED | OUTPATIENT
Start: 2017-10-26 | End: 2017-10-26

## 2017-10-26 RX ORDER — OXYCODONE HYDROCHLORIDE 5 MG/1
5-10 TABLET ORAL
Status: DISCONTINUED | OUTPATIENT
Start: 2017-10-26 | End: 2017-10-27 | Stop reason: HOSPADM

## 2017-10-26 RX ORDER — BUPIVACAINE HYDROCHLORIDE AND EPINEPHRINE 2.5; 5 MG/ML; UG/ML
INJECTION, SOLUTION INFILTRATION; PERINEURAL PRN
Status: DISCONTINUED | OUTPATIENT
Start: 2017-10-26 | End: 2017-10-26

## 2017-10-26 RX ORDER — PROPOFOL 10 MG/ML
INJECTION, EMULSION INTRAVENOUS CONTINUOUS PRN
Status: DISCONTINUED | OUTPATIENT
Start: 2017-10-26 | End: 2017-10-26

## 2017-10-26 RX ORDER — AMOXICILLIN 250 MG
1-2 CAPSULE ORAL 2 TIMES DAILY
Status: DISCONTINUED | OUTPATIENT
Start: 2017-10-26 | End: 2017-10-27 | Stop reason: HOSPADM

## 2017-10-26 RX ORDER — ONDANSETRON 2 MG/ML
INJECTION INTRAMUSCULAR; INTRAVENOUS PRN
Status: DISCONTINUED | OUTPATIENT
Start: 2017-10-26 | End: 2017-10-26

## 2017-10-26 RX ORDER — FLUMAZENIL 0.1 MG/ML
0.2 INJECTION, SOLUTION INTRAVENOUS
Status: DISCONTINUED | OUTPATIENT
Start: 2017-10-26 | End: 2017-10-26

## 2017-10-26 RX ORDER — SODIUM CHLORIDE, SODIUM LACTATE, POTASSIUM CHLORIDE, CALCIUM CHLORIDE 600; 310; 30; 20 MG/100ML; MG/100ML; MG/100ML; MG/100ML
INJECTION, SOLUTION INTRAVENOUS CONTINUOUS
Status: DISCONTINUED | OUTPATIENT
Start: 2017-10-26 | End: 2017-10-27 | Stop reason: HOSPADM

## 2017-10-26 RX ORDER — HYDROMORPHONE HYDROCHLORIDE 1 MG/ML
.3-.5 INJECTION, SOLUTION INTRAMUSCULAR; INTRAVENOUS; SUBCUTANEOUS
Status: DISCONTINUED | OUTPATIENT
Start: 2017-10-26 | End: 2017-10-27 | Stop reason: HOSPADM

## 2017-10-26 RX ORDER — SODIUM CHLORIDE, SODIUM LACTATE, POTASSIUM CHLORIDE, CALCIUM CHLORIDE 600; 310; 30; 20 MG/100ML; MG/100ML; MG/100ML; MG/100ML
INJECTION, SOLUTION INTRAVENOUS CONTINUOUS PRN
Status: DISCONTINUED | OUTPATIENT
Start: 2017-10-26 | End: 2017-10-26

## 2017-10-26 RX ORDER — ACETAMINOPHEN 325 MG/1
975 TABLET ORAL ONCE
Status: COMPLETED | OUTPATIENT
Start: 2017-10-26 | End: 2017-10-26

## 2017-10-26 RX ORDER — GABAPENTIN 300 MG/1
300 CAPSULE ORAL ONCE
Status: COMPLETED | OUTPATIENT
Start: 2017-10-26 | End: 2017-10-26

## 2017-10-26 RX ORDER — CYCLOBENZAPRINE HCL 5 MG
10 TABLET ORAL 3 TIMES DAILY PRN
Status: DISCONTINUED | OUTPATIENT
Start: 2017-10-26 | End: 2017-10-27 | Stop reason: HOSPADM

## 2017-10-26 RX ORDER — EPHEDRINE SULFATE 50 MG/ML
INJECTION, SOLUTION INTRAVENOUS PRN
Status: DISCONTINUED | OUTPATIENT
Start: 2017-10-26 | End: 2017-10-26

## 2017-10-26 RX ORDER — GLYCOPYRROLATE 0.2 MG/ML
INJECTION, SOLUTION INTRAMUSCULAR; INTRAVENOUS PRN
Status: DISCONTINUED | OUTPATIENT
Start: 2017-10-26 | End: 2017-10-26

## 2017-10-26 RX ORDER — NALOXONE HYDROCHLORIDE 0.4 MG/ML
.1-.4 INJECTION, SOLUTION INTRAMUSCULAR; INTRAVENOUS; SUBCUTANEOUS
Status: DISCONTINUED | OUTPATIENT
Start: 2017-10-26 | End: 2017-10-27 | Stop reason: HOSPADM

## 2017-10-26 RX ORDER — NALOXONE HYDROCHLORIDE 0.4 MG/ML
.1-.4 INJECTION, SOLUTION INTRAMUSCULAR; INTRAVENOUS; SUBCUTANEOUS
Status: DISCONTINUED | OUTPATIENT
Start: 2017-10-26 | End: 2017-10-26

## 2017-10-26 RX ORDER — MEPERIDINE HYDROCHLORIDE 25 MG/ML
12.5 INJECTION INTRAMUSCULAR; INTRAVENOUS; SUBCUTANEOUS
Status: DISCONTINUED | OUTPATIENT
Start: 2017-10-26 | End: 2017-10-26

## 2017-10-26 RX ORDER — CEFAZOLIN SODIUM 2 G/100ML
2 INJECTION, SOLUTION INTRAVENOUS EVERY 8 HOURS
Status: COMPLETED | OUTPATIENT
Start: 2017-10-26 | End: 2017-10-27

## 2017-10-26 RX ORDER — HYDROMORPHONE HYDROCHLORIDE 1 MG/ML
0.5 INJECTION, SOLUTION INTRAMUSCULAR; INTRAVENOUS; SUBCUTANEOUS
Status: DISCONTINUED | OUTPATIENT
Start: 2017-10-26 | End: 2017-10-26

## 2017-10-26 RX ORDER — ONDANSETRON 4 MG/1
4 TABLET, ORALLY DISINTEGRATING ORAL EVERY 6 HOURS PRN
Status: DISCONTINUED | OUTPATIENT
Start: 2017-10-26 | End: 2017-10-27 | Stop reason: HOSPADM

## 2017-10-26 RX ORDER — BUPIVACAINE HYDROCHLORIDE 7.5 MG/ML
INJECTION, SOLUTION INTRASPINAL PRN
Status: DISCONTINUED | OUTPATIENT
Start: 2017-10-26 | End: 2017-10-26

## 2017-10-26 RX ORDER — DIPHENHYDRAMINE HYDROCHLORIDE 50 MG/ML
25 INJECTION INTRAMUSCULAR; INTRAVENOUS EVERY 6 HOURS PRN
Status: DISCONTINUED | OUTPATIENT
Start: 2017-10-26 | End: 2017-10-26

## 2017-10-26 RX ORDER — ONDANSETRON 2 MG/ML
4 INJECTION INTRAMUSCULAR; INTRAVENOUS EVERY 30 MIN PRN
Status: DISCONTINUED | OUTPATIENT
Start: 2017-10-26 | End: 2017-10-26

## 2017-10-26 RX ORDER — KETOROLAC TROMETHAMINE 30 MG/ML
INJECTION, SOLUTION INTRAMUSCULAR; INTRAVENOUS PRN
Status: DISCONTINUED | OUTPATIENT
Start: 2017-10-26 | End: 2017-10-26

## 2017-10-26 RX ORDER — LIDOCAINE 40 MG/G
CREAM TOPICAL
Status: DISCONTINUED | OUTPATIENT
Start: 2017-10-26 | End: 2017-10-27 | Stop reason: HOSPADM

## 2017-10-26 RX ORDER — ONDANSETRON 2 MG/ML
4 INJECTION INTRAMUSCULAR; INTRAVENOUS EVERY 6 HOURS PRN
Status: DISCONTINUED | OUTPATIENT
Start: 2017-10-26 | End: 2017-10-27 | Stop reason: HOSPADM

## 2017-10-26 RX ORDER — METOCLOPRAMIDE 10 MG/1
10 TABLET ORAL EVERY 6 HOURS PRN
Status: DISCONTINUED | OUTPATIENT
Start: 2017-10-26 | End: 2017-10-27 | Stop reason: HOSPADM

## 2017-10-26 RX ORDER — PROCHLORPERAZINE MALEATE 5 MG
5-10 TABLET ORAL EVERY 6 HOURS PRN
Status: DISCONTINUED | OUTPATIENT
Start: 2017-10-26 | End: 2017-10-27 | Stop reason: HOSPADM

## 2017-10-26 RX ORDER — SODIUM CHLORIDE 9 MG/ML
INJECTION, SOLUTION INTRAVENOUS CONTINUOUS
Status: DISCONTINUED | OUTPATIENT
Start: 2017-10-26 | End: 2017-10-27 | Stop reason: HOSPADM

## 2017-10-26 RX ORDER — ACETAMINOPHEN 325 MG/1
975 TABLET ORAL EVERY 8 HOURS
Status: DISCONTINUED | OUTPATIENT
Start: 2017-10-26 | End: 2017-10-27 | Stop reason: HOSPADM

## 2017-10-26 RX ORDER — CEFAZOLIN SODIUM 1 G/3ML
1 INJECTION, POWDER, FOR SOLUTION INTRAMUSCULAR; INTRAVENOUS SEE ADMIN INSTRUCTIONS
Status: DISCONTINUED | OUTPATIENT
Start: 2017-10-26 | End: 2017-10-26

## 2017-10-26 RX ORDER — METOCLOPRAMIDE HYDROCHLORIDE 5 MG/ML
10 INJECTION INTRAMUSCULAR; INTRAVENOUS EVERY 6 HOURS PRN
Status: DISCONTINUED | OUTPATIENT
Start: 2017-10-26 | End: 2017-10-27 | Stop reason: HOSPADM

## 2017-10-26 RX ORDER — FENTANYL CITRATE 50 UG/ML
25-50 INJECTION, SOLUTION INTRAMUSCULAR; INTRAVENOUS
Status: DISCONTINUED | OUTPATIENT
Start: 2017-10-26 | End: 2017-10-26

## 2017-10-26 RX ORDER — GABAPENTIN 100 MG/1
300 CAPSULE ORAL 2 TIMES DAILY
Status: DISCONTINUED | OUTPATIENT
Start: 2017-10-26 | End: 2017-10-27 | Stop reason: HOSPADM

## 2017-10-26 RX ADMIN — BUPIVACAINE HYDROCHLORIDE IN DEXTROSE 1.4 ML: 7.5 INJECTION, SOLUTION SUBARACHNOID at 09:45

## 2017-10-26 RX ADMIN — FENTANYL CITRATE 50 MCG: 50 INJECTION, SOLUTION INTRAMUSCULAR; INTRAVENOUS at 08:13

## 2017-10-26 RX ADMIN — PROPOFOL 60 MCG/KG/MIN: 10 INJECTION, EMULSION INTRAVENOUS at 09:45

## 2017-10-26 RX ADMIN — FENTANYL CITRATE 50 MCG: 50 INJECTION, SOLUTION INTRAMUSCULAR; INTRAVENOUS at 09:11

## 2017-10-26 RX ADMIN — EPHEDRINE SULFATE 10 MG: 50 INJECTION, SOLUTION INTRAVENOUS at 12:44

## 2017-10-26 RX ADMIN — ONDANSETRON 4 MG: 2 INJECTION INTRAMUSCULAR; INTRAVENOUS at 09:05

## 2017-10-26 RX ADMIN — SODIUM CHLORIDE, POTASSIUM CHLORIDE, SODIUM LACTATE AND CALCIUM CHLORIDE: 600; 310; 30; 20 INJECTION, SOLUTION INTRAVENOUS at 07:58

## 2017-10-26 RX ADMIN — PHENYLEPHRINE HYDROCHLORIDE 0.2 MCG/KG/MIN: 10 INJECTION, SOLUTION INTRAMUSCULAR; INTRAVENOUS; SUBCUTANEOUS at 10:34

## 2017-10-26 RX ADMIN — MIDAZOLAM HYDROCHLORIDE 1 MG: 1 INJECTION, SOLUTION INTRAMUSCULAR; INTRAVENOUS at 11:51

## 2017-10-26 RX ADMIN — CEFAZOLIN SODIUM 2 G: 2 INJECTION, SOLUTION INTRAVENOUS at 09:50

## 2017-10-26 RX ADMIN — VASOPRESSIN 1 UNITS: 20 INJECTION, SOLUTION INTRAMUSCULAR; SUBCUTANEOUS at 12:50

## 2017-10-26 RX ADMIN — CEFAZOLIN SODIUM 1 G: 2 INJECTION, SOLUTION INTRAVENOUS at 11:50

## 2017-10-26 RX ADMIN — EPHEDRINE SULFATE 10 MG: 50 INJECTION, SOLUTION INTRAVENOUS at 10:17

## 2017-10-26 RX ADMIN — GABAPENTIN 300 MG: 300 CAPSULE ORAL at 07:47

## 2017-10-26 RX ADMIN — FENTANYL CITRATE 25 MCG: 50 INJECTION, SOLUTION INTRAMUSCULAR; INTRAVENOUS at 10:36

## 2017-10-26 RX ADMIN — MIDAZOLAM HYDROCHLORIDE 2 MG: 1 INJECTION, SOLUTION INTRAMUSCULAR; INTRAVENOUS at 09:05

## 2017-10-26 RX ADMIN — BUPIVACAINE HYDROCHLORIDE AND EPINEPHRINE BITARTRATE 20 ML: 2.5; .005 INJECTION, SOLUTION INFILTRATION; PERINEURAL at 08:25

## 2017-10-26 RX ADMIN — FENTANYL CITRATE 25 MCG: 50 INJECTION, SOLUTION INTRAMUSCULAR; INTRAVENOUS at 09:35

## 2017-10-26 RX ADMIN — Medication 0.3 MG: at 12:54

## 2017-10-26 RX ADMIN — CEFAZOLIN SODIUM 2 G: 2 INJECTION, SOLUTION INTRAVENOUS at 19:48

## 2017-10-26 RX ADMIN — ACETAMINOPHEN 975 MG: 325 TABLET, FILM COATED ORAL at 07:47

## 2017-10-26 RX ADMIN — BUPIVACAINE 10 ML: 13.3 INJECTION, SUSPENSION, LIPOSOMAL INFILTRATION at 08:25

## 2017-10-26 RX ADMIN — EPHEDRINE SULFATE 10 MG: 50 INJECTION, SOLUTION INTRAVENOUS at 10:11

## 2017-10-26 RX ADMIN — EPHEDRINE SULFATE 10 MG: 50 INJECTION, SOLUTION INTRAVENOUS at 09:50

## 2017-10-26 RX ADMIN — SODIUM CHLORIDE: 9 INJECTION, SOLUTION INTRAVENOUS at 16:23

## 2017-10-26 RX ADMIN — MIDAZOLAM HYDROCHLORIDE 1 MG: 1 INJECTION, SOLUTION INTRAMUSCULAR; INTRAVENOUS at 09:41

## 2017-10-26 RX ADMIN — KETOROLAC TROMETHAMINE 30 MG: 30 INJECTION, SOLUTION INTRAMUSCULAR at 12:30

## 2017-10-26 RX ADMIN — EPHEDRINE SULFATE 10 MG: 50 INJECTION, SOLUTION INTRAVENOUS at 09:57

## 2017-10-26 RX ADMIN — MIDAZOLAM HYDROCHLORIDE 1 MG: 1 INJECTION, SOLUTION INTRAMUSCULAR; INTRAVENOUS at 12:22

## 2017-10-26 RX ADMIN — MIDAZOLAM HYDROCHLORIDE 1 MG: 1 INJECTION, SOLUTION INTRAMUSCULAR; INTRAVENOUS at 10:36

## 2017-10-26 RX ADMIN — ACETAMINOPHEN 975 MG: 325 TABLET, FILM COATED ORAL at 16:23

## 2017-10-26 RX ADMIN — PHENYLEPHRINE HYDROCHLORIDE 50 MCG: 10 INJECTION, SOLUTION INTRAMUSCULAR; INTRAVENOUS; SUBCUTANEOUS at 10:20

## 2017-10-26 RX ADMIN — SODIUM CHLORIDE, POTASSIUM CHLORIDE, SODIUM LACTATE AND CALCIUM CHLORIDE: 600; 310; 30; 20 INJECTION, SOLUTION INTRAVENOUS at 11:40

## 2017-10-26 RX ADMIN — MIDAZOLAM HYDROCHLORIDE 1 MG: 1 INJECTION, SOLUTION INTRAMUSCULAR; INTRAVENOUS at 08:13

## 2017-10-26 RX ADMIN — FENTANYL CITRATE 25 MCG: 50 INJECTION, SOLUTION INTRAMUSCULAR; INTRAVENOUS at 11:51

## 2017-10-26 RX ADMIN — GABAPENTIN 300 MG: 100 CAPSULE ORAL at 19:47

## 2017-10-26 NOTE — OR NURSING
PACU to Inpatient Nursing Handoff    Patient Erin Corcoran is a 51 year old male who speaks English.   Procedure Procedure(s):  Left Tibia Internal Fixation, Left Triple Arthrodesis, Left Posterior Tibia Tendon Transfer, Left Achilles Lengthening - Wound Class: I-Clean   - Wound Class: I-Clean   - Wound Class: I-Clean   Surgeon(s) Primary: Anshul Castaneda MD  Assisting: Humberto Rockwell PA-C  Resident - Assisting: Cody Sheehan MD     No Known Allergies    Isolation  No active isolations    Past Medical History   has a past medical history of Obesity (BMI 30.0-34.9) and Post-polio limb muscle weakness.    Anesthesia Spinal   Dermatome Level     Preop Meds acetaminophen (Tylenol) - time given: 0747  gabapentin (Neurontin) - time given: 0747   Nerve block Interscalene.  Location:left. Med:bupivacaine and Exparel (liposomal bupivacaine). Time given: 0825  Femoral (popliteal).  Location:left. Med:bupivacaine and Exparel (liposomal bupivacaine). Time given: 0825   Intraop Meds fentanyl (Sublimaze):  125 mcg total  ketorolac (Toradol): last given at 1230  ondansetron (Zofran): last given at 0905   Local Meds No   Antibiotics cefazolin (Ancef) - last given at 1150     Pain Patient Currently in Pain: denies   PACU meds  Not applicable   PCA / epidural No   Capnography     Telemetry ECG Rhythm: Normal sinus rhythm      Labs Glucose Lab Results   Component Value Date     10/24/2017       Hgb Lab Results   Component Value Date    HGB 15.7 10/24/2017       INR No results found for: INR   PACU Imaging Not applicable     Wound/Incision     CMS Peripheral Neurovascular WDL: WDL (10/26/17 1309)  LLE Temperature: warm (10/26/17 1309)  LLE Color: no discoloration (10/26/17 1309)  LLE Sensation: numbness present (10/26/17 1309)  RLE Temperature: warm (10/26/17 1309)  RLE Color: no discoloration (10/26/17 1309)  RLE Sensation: no tingling;no numbness (10/26/17 1309)   Equipment ice pack   Other LDA       IV  Access Peripheral IV 10/26/17 Left Hand (Active)   Site Assessment WDL 10/26/2017  1:09 PM   Line Status Infusing;Checked every 1 hour 10/26/2017  1:09 PM   Phlebitis Scale 0-->no symptoms 10/26/2017  1:09 PM   Infiltration Scale 0 10/26/2017  7:58 AM   Number of days:0      Blood Products Not applicable EBL surg log * No blood loss amount entered *   Intake/Output Date 10/26/17 0700 - 10/27/17 0659   Shift 1890-8822 7496-6977 6405-6413 24 Hour Total   I  N  T  A  K  E   I.V. 1000   1000    Shift Total  (mL/kg) 1000  (10.27)   1000  (10.27)   O  U  T  P  U  T   Urine 560   560    Blood 25   25    Shift Total  (mL/kg) 585  (6.01)   585  (6.01)   Weight (kg) 97.4 97.4 97.4 97.4        Drains / Rodriguez     Time of void PreOp Void Prior to Procedure: 0637 (10/26/17 0737)    PostOp     Bladder Scan     PO    tolerating sips and crackers     Vitals    B/P: 108/84  T: 96.8  F (36  C)    Temp src: Axillary  P:  Pulse: 65 (10/26/17 0713)    Heart Rate: 92 (10/26/17 1330)     R: 15  O2:  SpO2: 93 %    O2 Device: None (Room air) (10/26/17 1330)    Oxygen Delivery: 8 LPM (10/26/17 1315)         Family/support present yes   Patient belongings Patient Belongings: clothing;shoes  Disposition of Belongings: Sent to security per site process   Patient transported on cart and air mat   DC meds/scripts (obs/outpt) Not applicable     Special needs/considerations None   Tasks needing completion None       Chari Fragoso, ANA  [facility]COM [id number]

## 2017-10-26 NOTE — IP AVS SNAPSHOT
[facility]    [address] [place] [address]    Phone:  [phone]                                       After Visit Summary   10/26/2017    Erin Corcoran    MRN: [id number]           After Visit Summary Signature Page     I have received my discharge instructions, and my questions have been answered. I have discussed any challenges I see with this plan with the nurse or doctor.    ..........................................................................................................................................  Patient/Patient Representative Signature      ..........................................................................................................................................  Patient Representative Print Name and Relationship to Patient    ..................................................               ................................................  Date                                            Time    ..........................................................................................................................................  Reviewed by Signature/Title    ...................................................              ..............................................  Date                                                            Time

## 2017-10-26 NOTE — ANESTHESIA PROCEDURE NOTES
Spinal/LP Procedure Note    Spinal Block  Staff:     Anesthesiologist:  GAMALIEL LAN  Location: OR  Procedure Start/Stop Times:      10/26/2017 9:30 AM     10/26/2017 9:45 AM    patient identified, IV checked, site marked, risks and benefits discussed, informed consent, monitors and equipment checked, pre-op evaluation, at physician/surgeon's request and post-op pain management      Correct Patient: Yes      Correct Position: Yes      Correct Site: Yes      Correct Procedure: Yes      Correct Laterality:  Yes    Site Marked:  Yes  Procedure:     Procedure:  Intrathecal    ASA:  3    Diagnosis:  Tibia surgery    Position:  Sitting    Sterile Prep: chloraprep, mask, sterile gloves and patient draped      Insertion site:  L2-3    Approach:  Midline    Needle Type:  Chiki    Needle gauge (G):  25    Local Skin Infiltration:  2% lidocaine    amount (ml):  5    Needle Length (in):  3.5    Introducer used: Yes      Introducer gauge:  20 G    Attempts:  3    Redirects:  1    CSF:  Clear    Paresthesias:  No    Time injected:  09:45  Assessment/Narrative:     Sensory Level:  T10

## 2017-10-26 NOTE — ANESTHESIA CARE TRANSFER NOTE
Patient: Erin Corcoran    Procedure(s):  Left Tibia Internal Fixation, Left Triple Arthrodesis, Left Posterior Tibia Tendon Transfer, Left Achilles Lengthening - Wound Class: I-Clean   - Wound Class: I-Clean   - Wound Class: I-Clean    Diagnosis: Foot Deformity  Diagnosis Additional Information: No value filed.    Anesthesia Type:   Spinal, Periph. Nerve Block for postop pain     Note:  Airway :Face Mask (O2 at 6L per simple facemask)  Patient transferred to:PACU  Comments: Report to Chari PEREZ.  Patient awake and VSS        Vitals: (Last set prior to Anesthesia Care Transfer)    CRNA VITALS  10/26/2017 1236 - 10/26/2017 1320      10/26/2017             Pulse: 68    Ht Rate: 68    SpO2: 99 %                Electronically Signed By: MERE Herbert CRNA  October 26, 2017  1:20 PM

## 2017-10-26 NOTE — CONSULTS
INTERNAL MEDICINE CONSULTATION       DATE OF EXAMINATION:  10/26/2017      ASSESSMENT:   1.  Status post left tibia internal fixation, left tibia arthrodesis, left posterior tibia tendon transfer with left Achilles lengthening for the treatment of chronic deformity related to polio.  The patient is doing well postoperatively.  Pain is controlled.   2.  History of borderline hypertension on no medications.   3.  Obesity.    4.  Unknown coronary artery status.   5.  Possible sleep apnea based on patient's wife's report of loud snoring.  The patient has manifested no evidence of significant sleep apnea postoperatively.      RECOMMENDATIONS:     1.  Routine postoperative labs were ordered.   2.  Oral intravenous pain medications are ordered.   3.  DVT prophylaxis will be with aspirin per Dr. Castaneda's protocol.   4.  Blood pressure will be monitored perioperatively.  I think it is unlikely he would need medical management for this.      Thank you for allowing me to see this patient.      DISCUSSION:  Erin Corcoran is a 51-year-old [place] native who underwent the above-mentioned surgery for the treatment of a left lower extremity deformity related to polio.  I have been asked to see him by Dr. Castaneda to assess medical problems including possible hypertension and sleep apnea.      Please see Dr. Castaneda's notes in the charting for details regarding the patient's orthopedic history and indications for surgery.  The events of surgery are noted.  Estimated blood loss was 25 mL.  The patient was hemodynamically stable throughout the surgery.  Blood pressures ranged from the 100s to 160s over 60s to 90s.  Most recent blood pressure is 143/66.  Heart rate is in the 60s.       Mr. Corcoran describes minimal discomfort at the current time involving his left leg.  He denies cough, chest pain, shortness of breath, nausea or vomiting.        PAST MEDICAL HISTORY:   1.  Chronic left lower extremity weakness secondary to polio.     2.  Obesity.   3.  Borderline hypertension per patient account.  He has not received treatment for this.   4.  Unknown coronary artery status.  He has never had a formal coronary artery evaluation.   5.  No history of DVT or PE.   6.  History of loud snoring per wife's account.  He has never been formally assessed for sleep apnea.        MEDICATIONS PRIOR TO ADMISSION:  None.      SOCIAL HISTORY:  The patient lives in [facility].  He operates a fishing and diving company.        FAMILY HISTORY:  Remarkable for several siblings having hypertension.      HABITS:  He denies cigarette or alcohol use.        REVIEW OF SYSTEMS:  Remarkable for chronic left lower extremity pain which limits his walking significantly.  He denies rest or exertional chest pain, shortness of breath, cough, fevers, chills, abdominal pain, bowel or bladder difficulties.  He denies excessive daytime somnolence.       PHYSICAL EXAMINATION:     GENERAL:  He is a pleasant male who appears comfortable.     VITAL SIGNS:  As above.  BMI is 33.62 kg/meters squared.   HEENT:  Pharynx benign.  There are no carotid bruits.  Teeth are in good repair.   LUNGS:  Clear.     CARDIAC:  Regular rate and rhythm without murmurs.     ABDOMEN:  Soft, protuberant, nontender.     EXTREMITIES:  Left lower extremity is dropped.  There is no right lower extremity edema.        PREOPERATIVE LABORATORY:  Include a normal BMP and CBC.  Nonfasting glucose was 122.        IMAGING:  He did not have an EKG preoperatively.         SILAS DUNBAR MD             D: 10/26/2017 15:50   T: 10/26/2017 16:54   MT: VAHID      Name:     AASHISH SAENZ   MRN:      [id number]        Account:       [id number]   :      1965           Consult Date:  10/26/2017      Document: [id number]

## 2017-10-26 NOTE — ANESTHESIA POSTPROCEDURE EVALUATION
Patient: Erin Corcoran    Procedure(s):  Left Tibia Internal Fixation, Left Triple Arthrodesis, Left Posterior Tibia Tendon Transfer, Left Achilles Lengthening - Wound Class: I-Clean   - Wound Class: I-Clean   - Wound Class: I-Clean    Diagnosis:Foot Deformity  Diagnosis Additional Information: No value filed.    Anesthesia Type:  Spinal, Periph. Nerve Block for postop pain    Note:  Anesthesia Post Evaluation    Patient location during evaluation: PACU  Patient participation: Able to fully participate in evaluation  Level of consciousness: awake and alert  Pain management: adequate  Airway patency: patent  Cardiovascular status: acceptable  Respiratory status: acceptable  Hydration status: acceptable  PONV: none     Anesthetic complications: None          Last vitals:  Vitals:    10/26/17 1315 10/26/17 1330 10/26/17 1342   BP: 109/82 108/84    Pulse:      Resp: 15 15    Temp:   36  C (96.8  F)   SpO2: 100% 93%          Electronically Signed By: Yousif Kim MD, MD  October 26, 2017  1:52 PM

## 2017-10-26 NOTE — IP AVS SNAPSHOT
MRN:[id number]                      After Visit Summary   10/26/2017    Erin Corcoran    MRN: [id number]           Thank you!     Thank you for choosing [facility] for your care. Our goal is always to provide you with excellent care. Hearing back from our patients is one way we can continue to improve our services. Please take a few minutes to complete the written survey that you may receive in the mail after you visit with us. Thank you!        Patient Information     Date Of Birth          1965        Designated Caregiver       Most Recent Value    Caregiver    Will someone help with your care after discharge? yes    Name of designated caregiver Bonnie    Phone number of caregiver     Caregiver address [address] [facility]      About your hospital stay     You were admitted on:  October 26, 2017 You last received care in the:  [facility]    You were discharged on:  October 27, 2017        Reason for your hospital stay       Left foot surgery                  Who to Call     For medical emergencies, please call 911.  For non-urgent questions about your medical care, please call your primary care provider or clinic, None  For questions related to your surgery, please call your surgery clinic        Attending Provider     Provider Specialty    Anshul Castaneda MD Orthopedics       Primary Care Provider Fax #    Provider Not In System [phone]      After Care Instructions     Activity       Your activity upon discharge:  No weight bearing on the left leg            Diet       Follow this diet upon discharge: Regular            Discharge Instructions       Check with primary physician regarding out patient sleep evaluation.  Wean opiates as able.   Dulcolox or glycerin suppository (over the counter) if 3 days and no BM.    Continue use of Incentive spirometer and sleep with head of bed up 20-30 degrees.            Wound care and dressings       Instructions to care for  "your wound at home:  Keep your splint clean, dry, and intact until your follow up appointment.                  Follow-up Appointments     Adult [facility] Follow-up and recommended labs and tests       Follow up with Dr. Castaneda in 1 week for splint removal    Appointments on [place] and/or [facility] (with [facility] or [facility] provider or service). Call [phone] if you haven't heard regarding these appointments within 7 days of discharge.                  Your next 10 appointments already scheduled     Nov 09, 2017 10:00 AM CST   (Arrive by 9:45 AM)   Post-Op with MERE Whittington CNP   [facility] ([facility])    [address]  [address] [facility] [address] [phone]            Dec 18, 2017 11:45 AM CST   (Arrive by 11:30 AM)   Return Visit with Anshul Castaneda MD   [facility] ([facility])    [address] [place] [address] [phone]              Future tests that were ordered for you     Assign Questionnaire Series to Patient                 Pending Results     No orders found for last 3 day(s).            Statement of Approval     Ordered          10/27/17 5894  I have reviewed and agree with all the recommendations and orders detailed in this document.  EFFECTIVE NOW     Approved and electronically signed by:  Amena Myrick APRN CNP           10/27/17 9466  I have reviewed and agree with all the recommendations and orders detailed in this document.     Approved and electronically signed by:  Migue Foss MD             Admission Information     Date & Time Provider Department Dept. Phone    10/26/2017 Anshul Castaneda MD [facility] 8[facility] [phone]      Your Vitals Were     Blood Pressure Pulse Temperature Respirations Height Weight    111/64 60 97.1  F (36.2  C) 16 1.702 m (5' 7.01\") 97.4 kg (214 lb 11.7 oz)    Pulse Oximetry BMI (Body Mass Index)                " "95% 33.62 kg/m2          [facility] Information     [facility] lets you send messages to your doctor, view your test results, renew your prescriptions, schedule appointments and more. To sign up, go to [email or web address] . Click on \"Log in\" on the left side of the screen, which will take you to the Welcome page. Then click on \"Sign up Now\" on the right side of the page.     You will be asked to enter the access code listed below, as well as some personal information. Please follow the directions to create your username and password.     Your access code is: [email or web address]  Expires: 2018  4:27 PM     Your access code will  in 90 days. If you need help or a new code, please call your [facility] clinic or [phone].        Care EveryWhere ID     This is your Care EveryWhere ID. This could be used by other organizations to access your [facility] medical records  [id number]        Equal Access to Services     BLADE VILLANUEVA : Hadii natanael gandhio Solarry, waaxda luqadaha, qaybta kaalmada ademartha, marlen mauro . So [facility] [phone].    ATENCIÓN: Si habla español, tiene a hollis disposición servicios gratuitos de asistencia lingüística. Llame al [phone].    We comply with applicable federal civil rights laws and [place] laws. We do not discriminate on the basis of race, color, national origin, age, disability, sex, sexual orientation, or gender identity.               Review of your medicines      START taking        Dose / Directions    acetaminophen 325 MG tablet   Commonly known as:  TYLENOL        Dose:  650 mg   Start taking on:  10/29/2017   Take 2 tablets (650 mg) by mouth every 4 hours as needed for other (surgical pain)   Quantity:  100 tablet   Refills:  1       aspirin 325 MG EC tablet        Dose:  325 mg   Take 1 tablet (325 mg) by mouth daily for 28 days   Quantity:  28 tablet   Refills:  0       gabapentin 300 MG capsule   Commonly known as:  NEURONTIN        Dose:  " 300 mg   Take 1 capsule (300 mg) by mouth 2 times daily for 14 days   Quantity:  28 capsule   Refills:  0       oxyCODONE 5 MG IR tablet   Commonly known as:  ROXICODONE        Dose:  5-10 mg   Take 1-2 tablets (5-10 mg) by mouth every 3 hours as needed for moderate to severe pain   Quantity:  70 tablet   Refills:  0       senna-docusate 8.6-50 MG per tablet   Commonly known as:  SENOKOT-S;PERICOLACE        Dose:  1-2 tablet   Take 1-2 tablets by mouth 2 times daily as needed for constipation   Quantity:  40 tablet   Refills:  1            Where to get your medicines      These medications were sent to [facility] [place], [place] - [address]  [address] [address]     Phone:  [phone]     acetaminophen 325 MG tablet    aspirin 325 MG EC tablet    gabapentin 300 MG capsule    senna-docusate 8.6-50 MG per tablet         Some of these will need a paper prescription and others can be bought over the counter. Ask your nurse if you have questions.     Bring a paper prescription for each of these medications     oxyCODONE 5 MG IR tablet                Protect others around you: Learn how to safely use, store and throw away your medicines at www.disposemymeds.org.             Medication List: This is a list of all your medications and when to take them. Check marks below indicate your daily home schedule. Keep this list as a reference.      Medications           Morning Afternoon Evening Bedtime As Needed    acetaminophen 325 MG tablet   Commonly known as:  TYLENOL   Take 2 tablets (650 mg) by mouth every 4 hours as needed for other (surgical pain)   Start taking on:  10/29/2017   Last time this was given:  975 mg on 10/27/2017  8:40 AM                                aspirin 325 MG EC tablet   Take 1 tablet (325 mg) by mouth daily for 28 days   Last time this was given:  325 mg on 10/27/2017  8:40 AM                                gabapentin 300 MG capsule   Commonly known  as:  NEURONTIN   Take 1 capsule (300 mg) by mouth 2 times daily for 14 days   Last time this was given:  300 mg on 10/27/2017  8:40 AM                                oxyCODONE 5 MG IR tablet   Commonly known as:  ROXICODONE   Take 1-2 tablets (5-10 mg) by mouth every 3 hours as needed for moderate to severe pain   Last time this was given:  10 mg on 10/27/2017  3:30 PM                                senna-docusate 8.6-50 MG per tablet   Commonly known as:  SENOKOT-S;PERICOLACE   Take 1-2 tablets by mouth 2 times daily as needed for constipation   Last time this was given:  2 tablets on 10/27/2017  8:40 AM

## 2017-10-26 NOTE — LETTER
Transition Communication Hand-off for Care Transitions to Next Level of Care Provider    Name: Erin Corcoran  MRN #: [id number]  Primary Care Provider: Provider Not In System     Primary Clinic:       Reason for Hospitalization:  Status post osteotomy [Z98.890]  Admit Date/Time: 10/26/2017  7:02 AM  Discharge Date: 10/27/2017  Payor Source: No coverage found.           Reason for Communication Hand-off Referral: Avoidable readmission within 30 days    Discharge Needs Assessment:  Needs       Most Recent Value    Equipment Currently Used at Home none        Follow-up specialty is recommended: Yes    Follow-up plan:  Future Appointments  Date Time Provider Department Center   11/9/2017 10:00 AM Kenia Parsons APRN [facility]   12/18/2017 11:45 AM Anshul Castaneda MD [facility]       Dayanara Segura RN, BSN  Care Coordinator,   Phone [phone]  Pager [phone]    AVS/Discharge Summary is the source of truth; this is a helpful guide for improved communication of patient story

## 2017-10-26 NOTE — ANESTHESIA PROCEDURE NOTES
Peripheral Nerve Block Procedure Note    Staff:     Anesthesiologist:  GAMALIEL LAN    Referred By:  MARCELLE MENG  Location: Pre-op  Procedure Start/Stop TImes:      10/26/2017 8:20 AM     10/26/2017 8:26 AM    patient identified, IV checked, site marked, risks and benefits discussed, informed consent, monitors and equipment checked, pre-op evaluation, at physician/surgeon's request and post-op pain management      Correct Patient: Yes      Correct Position: Yes      Correct Site: Yes      Correct Procedure: Yes      Correct Laterality:  Yes    Site Marked:  Yes  Procedure details:     Procedure:  Popliteal    ASA:  3    Diagnosis:  Tibial Surgery    Laterality:  Left    Position:  Supine    Sterile Prep: chloraprep, mask and sterile gloves      Local skin infiltration:  None    Needle:  Short bevel and insulated    Needle gauge:  21    Needle length (inches):  4    Ultrasound: Yes      Ultrasound used to identify targeted nerve, plexus, or vascular structure and placed a needle adjacent to it      Permanent Image entered into patiient's record      Abnormal pain on injection: No      Blood Aspirated: No      Paresthesias:  No    Bleeding at site: No      Bolus via:  Needle    Infusion Method:  Single Shot    Complications:  None  Assessment/Narrative:     Injection made incrementally with aspirations every (mL):  5     Informed consent obtained.  All risks and benefits of the nerve block discussed with the patient.  All questions answered and all parties agreed with the plan.   Discussed with Patient Off-Label use of Liposomal Bupivacaine (Exparel) for Nerve Block.    Relevant risks & benefits were discussed with patient.    All questions were answered and there was agreement to proceed.    Patient signed Off-Label Use of Exparel Consent Form.

## 2017-10-26 NOTE — PLAN OF CARE
Problem: Patient Care Overview  Goal: Plan of Care/Patient Progress Review  Outcome: No Change   Arrived from PACU via cart at 14:30. Patient A/Ox4. VSS. Denies CP, SOB, dizziness/LH. LSCTA. +fl/BS. Rodriguez in place. .Cast/ Dressing to affected foot CDI, elevated.Oriented to call light. On CAPNO  Monitor.Able to make needs known. Wife in room. Will continue to monitor.

## 2017-10-27 ENCOUNTER — APPOINTMENT (OUTPATIENT)
Dept: PHYSICAL THERAPY | Facility: CLINIC | Age: 52
End: 2017-10-27
Attending: ORTHOPAEDIC SURGERY

## 2017-10-27 ENCOUNTER — APPOINTMENT (OUTPATIENT)
Dept: OCCUPATIONAL THERAPY | Facility: CLINIC | Age: 52
End: 2017-10-27
Attending: ORTHOPAEDIC SURGERY

## 2017-10-27 VITALS
BODY MASS INDEX: 33.7 KG/M2 | DIASTOLIC BLOOD PRESSURE: 82 MMHG | RESPIRATION RATE: 20 BRPM | WEIGHT: 214.73 LBS | OXYGEN SATURATION: 97 % | TEMPERATURE: 99 F | HEART RATE: 97 BPM | SYSTOLIC BLOOD PRESSURE: 148 MMHG | HEIGHT: 67 IN

## 2017-10-27 PROBLEM — Z98.890 S/P FOOT SURGERY, LEFT: Status: ACTIVE | Noted: 2017-10-27

## 2017-10-27 LAB
ANION GAP SERPL CALCULATED.3IONS-SCNC: 3 MMOL/L (ref 3–14)
BUN SERPL-MCNC: 7 MG/DL (ref 7–30)
CALCIUM SERPL-MCNC: 8.1 MG/DL (ref 8.5–10.1)
CHLORIDE SERPL-SCNC: 105 MMOL/L (ref 94–109)
CO2 SERPL-SCNC: 30 MMOL/L (ref 20–32)
CREAT SERPL-MCNC: 0.68 MG/DL (ref 0.66–1.25)
ERYTHROCYTE [DISTWIDTH] IN BLOOD BY AUTOMATED COUNT: 13.3 % (ref 10–15)
GFR SERPL CREATININE-BSD FRML MDRD: >90 ML/MIN/1.7M2
GLUCOSE SERPL-MCNC: 172 MG/DL (ref 70–99)
HCT VFR BLD AUTO: 41.2 % (ref 40–53)
HGB BLD-MCNC: 13.5 G/DL (ref 13.3–17.7)
MCH RBC QN AUTO: 27.9 PG (ref 26.5–33)
MCHC RBC AUTO-ENTMCNC: 32.8 G/DL (ref 31.5–36.5)
MCV RBC AUTO: 85 FL (ref 78–100)
PLATELET # BLD AUTO: 188 10E9/L (ref 150–450)
POTASSIUM SERPL-SCNC: 4.2 MMOL/L (ref 3.4–5.3)
RBC # BLD AUTO: 4.84 10E12/L (ref 4.4–5.9)
SODIUM SERPL-SCNC: 138 MMOL/L (ref 133–144)
WBC # BLD AUTO: 8.5 10E9/L (ref 4–11)

## 2017-10-27 PROCEDURE — 99213 OFFICE O/P EST LOW 20 MIN: CPT | Performed by: INTERNAL MEDICINE

## 2017-10-27 PROCEDURE — 97165 OT EVAL LOW COMPLEX 30 MIN: CPT | Mod: GO | Performed by: OCCUPATIONAL THERAPIST

## 2017-10-27 PROCEDURE — 99231 SBSQ HOSP IP/OBS SF/LOW 25: CPT | Performed by: NURSE PRACTITIONER

## 2017-10-27 PROCEDURE — 36415 COLL VENOUS BLD VENIPUNCTURE: CPT | Performed by: ORTHOPAEDIC SURGERY

## 2017-10-27 PROCEDURE — 80048 BASIC METABOLIC PNL TOTAL CA: CPT | Performed by: ORTHOPAEDIC SURGERY

## 2017-10-27 PROCEDURE — 97116 GAIT TRAINING THERAPY: CPT | Mod: GP

## 2017-10-27 PROCEDURE — 97530 THERAPEUTIC ACTIVITIES: CPT | Mod: GP

## 2017-10-27 PROCEDURE — 25000128 H RX IP 250 OP 636: Performed by: ORTHOPAEDIC SURGERY

## 2017-10-27 PROCEDURE — 40000193 ZZH STATISTIC PT WARD VISIT

## 2017-10-27 PROCEDURE — 97535 SELF CARE MNGMENT TRAINING: CPT | Mod: GO | Performed by: OCCUPATIONAL THERAPIST

## 2017-10-27 PROCEDURE — 97161 PT EVAL LOW COMPLEX 20 MIN: CPT | Mod: GP

## 2017-10-27 PROCEDURE — 85027 COMPLETE CBC AUTOMATED: CPT | Performed by: ORTHOPAEDIC SURGERY

## 2017-10-27 PROCEDURE — 40000133 ZZH STATISTIC OT WARD VISIT: Performed by: OCCUPATIONAL THERAPIST

## 2017-10-27 PROCEDURE — 25000132 ZZH RX MED GY IP 250 OP 250 PS 637: Performed by: ORTHOPAEDIC SURGERY

## 2017-10-27 RX ORDER — ASPIRIN 325 MG
325 TABLET, DELAYED RELEASE (ENTERIC COATED) ORAL DAILY
Qty: 28 TABLET | Refills: 0 | Status: SHIPPED | OUTPATIENT
Start: 2017-10-27 | End: 2017-11-24

## 2017-10-27 RX ORDER — CYCLOBENZAPRINE HCL 10 MG
10 TABLET ORAL 3 TIMES DAILY PRN
Qty: 40 TABLET | Refills: 0 | Status: SHIPPED | OUTPATIENT
Start: 2017-10-27 | End: 2017-10-27

## 2017-10-27 RX ORDER — AMOXICILLIN 250 MG
1-2 CAPSULE ORAL 2 TIMES DAILY PRN
Qty: 40 TABLET | Refills: 1 | Status: SHIPPED | OUTPATIENT
Start: 2017-10-27 | End: 2017-12-18

## 2017-10-27 RX ORDER — GABAPENTIN 300 MG/1
300 CAPSULE ORAL 2 TIMES DAILY
Qty: 28 CAPSULE | Refills: 0 | Status: SHIPPED | OUTPATIENT
Start: 2017-10-27 | End: 2017-12-18

## 2017-10-27 RX ORDER — OXYCODONE HYDROCHLORIDE 5 MG/1
5-10 TABLET ORAL
Qty: 70 TABLET | Refills: 0 | Status: SHIPPED | OUTPATIENT
Start: 2017-10-27

## 2017-10-27 RX ORDER — ACETAMINOPHEN 325 MG/1
650 TABLET ORAL EVERY 4 HOURS PRN
Qty: 100 TABLET | Refills: 1 | Status: SHIPPED | OUTPATIENT
Start: 2017-10-29

## 2017-10-27 RX ADMIN — ACETAMINOPHEN 975 MG: 325 TABLET, FILM COATED ORAL at 08:40

## 2017-10-27 RX ADMIN — ASPIRIN 325 MG: 325 TABLET, DELAYED RELEASE ORAL at 08:40

## 2017-10-27 RX ADMIN — CEFAZOLIN SODIUM 2 G: 2 INJECTION, SOLUTION INTRAVENOUS at 04:33

## 2017-10-27 RX ADMIN — ACETAMINOPHEN 975 MG: 325 TABLET, FILM COATED ORAL at 00:52

## 2017-10-27 RX ADMIN — OXYCODONE HYDROCHLORIDE 10 MG: 5 TABLET ORAL at 04:34

## 2017-10-27 RX ADMIN — GABAPENTIN 300 MG: 100 CAPSULE ORAL at 08:40

## 2017-10-27 RX ADMIN — OXYCODONE HYDROCHLORIDE 10 MG: 5 TABLET ORAL at 15:30

## 2017-10-27 RX ADMIN — SENNOSIDES AND DOCUSATE SODIUM 2 TABLET: 8.6; 5 TABLET ORAL at 08:40

## 2017-10-27 RX ADMIN — OXYCODONE HYDROCHLORIDE 10 MG: 5 TABLET ORAL at 00:52

## 2017-10-27 NOTE — PROGRESS NOTES
10/27/17 1101   Quick Adds   Type of Visit Initial Occupational Therapy Evaluation   Living Environment   Lives With spouse   Living Arrangements apartment   Home Accessibility tub/shower is not walk in   Number of Stairs to Enter Home 0   Number of Stairs Within Home 0   Living Environment Comment Pt is from [place], staying here locally in apartment with wife radha 24/7 to assist. Elevator to access apartment, no stairs.  Plans to go to [place] in 10 days.   Self-Care   Usual Activity Tolerance good   Current Activity Tolerance fair   Regular Exercise no   Equipment Currently Used at Home none   Activity/Exercise/Self-Care Comment [place] self cares, works as a  for [employer]   Functional Level Prior   Ambulation 0-->independent   Transferring 0-->independent   Toileting 0-->independent   Bathing 0-->independent   Dressing 0-->independent   Eating 0-->independent   Communication 0-->understands/communicates without difficulty   Swallowing 0-->swallows foods/liquids without difficulty   Cognition 0 - no cognition issues reported   Fall history within last six months no   Which of the above functional risks had a recent onset or change? ambulation;transferring;toileting;bathing;dressing   Prior Functional Level Comment indep. at  baseline, ambulating without AD.   General Information   Onset of Illness/Injury or Date of Surgery - Date 10/26/17   Referring Physician Anshul Castaneda MD   Additional Occupational Profile Info/Pertinent History of Current Problem Left tibia derotational osteotomy, triple arthrodesis, PT tendon transfer, achilles lengthening    Precautions/Limitations no known precautions/limitations   Weight-Bearing Status - LLE nonweight-bearing   General Info Comments activity orders; amb. with A   Cognitive Status Examination   Orientation orientation to person, place and time   Level of Consciousness alert   Able to Follow Commands WNL/WFL   Cognitive Comment cognition WNL    Visual Perception   Visual Perception No deficits were identified   Strength   Strength Comments BUE strength WFL   Transfer Skill: Bed to Chair/Chair to Bed   Level of [place]: Bed to Chair stand-by assist   Physical Assist/Nonphysical Assist: Bed to Chair set-up required;1 person assist   Weight-Bearing Restrictions nonweight-bearing   Assistive Device - Transfer Skill Bed to Chair Chair to Bed Rehab Eval rolling walker   Transfer Skill: Sit to Stand   Level of [place]: Sit/Stand independent   Physical Assist/Nonphysical Assist: Sit/Stand set-up required   Transfer Skill: Sit to Stand nonweight-bearing   Assistive Device for Transfer: Sit/Stand rolling walker   Transfer Skill: Toilet Transfer   Level of [place]: Toilet contact guard   Physical Assist/Nonphysical Assist: Toilet set-up required;1 person assist   Weight-Bearing Restrictions: Toilet nonweight-bearing   Assistive Device grab bars   Upper Body Dressing   Level of [place]: Dress Upper Body independent   Lower Body Dressing   Level of [place]: Dress Lower Body stand-by assist   Grooming   Level of [place]: Grooming independent   Eating/Self Feeding   Level of [place]: Eating independent   Instrumental Activities of Daily Living (IADL)   Previous Responsibilities (I with all IADLs including working FT in [place]; spouse can A)   Activities of Daily Living Analysis   Impairments Contributing to Impaired Activities of Daily Living balance impaired;post surgical precautions;pain   General Therapy Interventions   Planned Therapy Interventions ADL retraining;progressive activity/exercise   Clinical Impression   Criteria for Skilled Therapeutic Interventions Met yes, treatment indicated   OT Diagnosis impaired ADLs/mobility   Influenced by the following impairments post op NWB precautions   Assessment of Occupational Performance 1-3 Performance Deficits   Identified Performance Deficits dressing, bathing, toileting   Clinical  "Decision Making (Complexity) Low complexity   Therapy Frequency (eval and 1 tx.)   Predicted Duration of Therapy Intervention (days/wks) 10/27/17   Anticipated Equipment Needs at Discharge (TBD)   Anticipated Discharge Disposition Home with Assist   Risks and Benefits of Treatment have been explained. Yes   Patient, Family & other staff in agreement with plan of care Yes   [facility] TM \"6 Clicks\"   2016, Trustees of [facility], under license to [employer].  All rights reserved.   6 Clicks Short Forms Daily Activity Inpatient Short Form   [facility]  \"6 Clicks\" Daily Activity Inpatient Short Form   1. Putting on and taking off regular lower body clothing? 3 - A Little   2. Bathing (including washing, rinsing, drying)? 3 - A Little   3. Toileting, which includes using toilet, bedpan or urinal? 4 - None   4. Putting on and taking off regular upper body clothing? 4 - None   5. Taking care of personal grooming such as brushing teeth? 4 - None   6. Eating meals? 4 - None   Daily Activity Raw Score (Score out of 24.Lower scores equate to lower levels of function) 22   Total Evaluation Time   Total Evaluation Time (Minutes) 8     "

## 2017-10-27 NOTE — DISCHARGE SUMMARY
Orthopaedic Surgery  Discharge Summary    Erin Corcoran MRN# [id number]   YOB: 1965 Age: 51 year old     Date of Admission:  10/26/2017  Date of Discharge::  10/27/2017  5:33 PM  Admitting Physician:  Anshul Castaneda MD  Discharge Physician:  Fern Kent MD  Primary Care Physician:        System, Provider Not In          Admission Diagnoses:   Status post osteotomy [Z98.890]          Discharge Diagnosis:   Status post osteotomy [Z98.890]  Polio  Left leg and foot deformity         Procedures:   10/25/17 Left tibia derotational osteotomy, triple arthrodesis, PT tendon transfer, achilles lengthening (Dr. Castaneda)         Consultations:   OCCUPATIONAL THERAPY ADULT IP CONSULT  PHYSICAL THERAPY ADULT IP CONSULT  INTERNAL MEDICINE ADULT IP CONSULT FOR [facility]           Medications Prior to Admission:     No prescriptions prior to admission.            Discharge Medications:      Erin Corcoran   Home Medication Instructions VANESSA:[id number]    Printed on:10/27/17 1106   Medication Information                      acetaminophen (TYLENOL) 325 MG tablet  Take 2 tablets (650 mg) by mouth every 4 hours as needed for other (surgical pain)             aspirin  MG EC tablet  Take 1 tablet (325 mg) by mouth daily for 28 days             cyclobenzaprine (FLEXERIL) 10 MG tablet  Take 1 tablet (10 mg) by mouth 3 times daily as needed for muscle spasms             gabapentin (NEURONTIN) 300 MG capsule  Take 1 capsule (300 mg) by mouth 2 times daily for 14 days             oxyCODONE (ROXICODONE) 5 MG IR tablet  Take 1-2 tablets (5-10 mg) by mouth every 3 hours as needed for moderate to severe pain             senna-docusate (SENOKOT-S;PERICOLACE) 8.6-50 MG per tablet  Take 1-2 tablets by mouth 2 times daily as needed for constipation                          Brief History of Illness:   51M with left foot deformity related to polio.           Hospital Course:   Pt was admitted following  the above procedure. Pt completed 24 hrs of perioperative antibiotics, worked well with physical therapy, and had their pain well controlled on oral medications. On the day of discharge, the incision was c/d/i and distal neurovascular exam was intact.        Discharge Procedure Orders  Reason for your hospital stay   Order Comments: Left foot surgery     Adult [facility] Follow-up and recommended labs and tests   Order Comments: Follow up with Dr. Castaneda in 1 week for splint removal    Appointments on [facility] and/or [facility] (with [facility] or [facility] provider or service). Call [phone] if you haven't heard regarding these appointments within 7 days of discharge.     Activity   Order Comments: Your activity upon discharge:  No weight bearing on the left leg   Order Specific Question Answer Comments   Is discharge order? Yes      Wound care and dressings   Order Comments: Instructions to care for your wound at home:  Keep your splint clean, dry, and intact until your follow up appointment.     Diet   Order Comments: Follow this diet upon discharge: Regular   Order Specific Question Answer Comments   Is discharge order? Yes      Assign Questionnaire Series to Patient              Discharge Disposition:   Discharged to home      Condition at discharge: Gelnn Kent MD                   :

## 2017-10-27 NOTE — PROGRESS NOTES
Pt. discharged at 5:30p via transport to meet his son at the [facility]. Pt. was accompanied by wife, and left with personal belongings. Prior to discharge, PIV was removed. Pt. received complete discharge paperwork and 5 medications as filled by discharge pharmacy. Pt. was given times of last dose for all discharge medications in writing on discharge medication sheets. Discharge teaching included times and side effects of medication, pain management, activity restrictions, dressing changes, and signs and symptoms of infection. Pt. to follow up with Dr. Castaneda in 1 week. Pt. had no further questions at the time of discharge and no unmet needs were identified.

## 2017-10-27 NOTE — PLAN OF CARE
Problem: Patient Care Overview  Goal: Plan of Care/Patient Progress Review  Discharge Planner PT   Patient plan for discharge: Home (apartment locally) with 24/7 assist   Current status: Indep with bed mobility and transfers. Difficulty ambulating secondary to history of polio with weakness in L LE and NWB L LE. Patient ambulates using  feet and trialed using crutches 100 feet with CGA. Able to maintain NWB but fatigues easily. Patient would likely benefit from transport wheelchair for community mobility upon discharge, and would need crutches prior to dc. Patient would benefit from 1-2 more PT sessions to continue working on ambulation   Barriers to return to prior living situation: Level of assist   Recommendations for discharge: Home with 24/7 assist, home vs OP PT  Rationale for recommendations: Patient would benefit from 1-2 more inpatient PT sessions to progress indep and safety with ambulation; may benefit from home vs OP PT        Entered by: Ana Maria Olmstead 10/27/2017 9:24 AM       PM session, pt demos indep with bed mobility, transfers, ambulation with forearm crutches 100 feet. Passed PT. Will complete orders.     Physical Therapy Discharge Summary    Reason for therapy discharge:    All goals and outcomes met, no further needs identified.    Progress towards therapy goal(s). See goals on Care Plan in [facility] electronic health record for goal details.  Goals met    Therapy recommendation(s):    Continue home exercise program.

## 2017-10-27 NOTE — OP NOTE
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      PROCEDURES PERFORMED:   1.  Left plantar fascia lengthening.   2.  Left tendo Achilles lengthening.   3.  Left calcaneal Dozier osteotomy with internal fixation.   4.  Removal of hardware, left calcaneus.   5.  Left midfoot dorsolateral closing wedge osteotomy with internal fixation.   6.  Left first metatarsal osteotomy with internal fixation.   7.  Derotational osteotomy of the left distal tibia with internal fixation.      SURGEON:  Anshul Castaneda MD      1ST ASSISTANT:  [place]       2ND ASSISTANT:  Humberto Rockwell PA-C       INDICATION FOR SURGERY:  Erin Corcoran has severe residuals of childhood poliomyelitis including recalcitrant rigid cavus foot deformity, varus hindfoot deformity, cavovarus midfoot deformity, and severe plantarflexion of the left first toe.  He has increasing gait disturbance, callosities, ulcers, and difficulty walking and wishes surgical correction.  He has severe residuals of poliomyelitis with no quadriceps power, minimal hamstring power, minimal plantarflexion power and over pulling of the posterior tibialis and the anterior tibialis muscle medially.  Risks, goals, and options were discussed in detail.  He understood and wished to proceed.      DESCRIPTION OF PROCEDURE:  Under subarachnoid block anesthesia in the supine position, the left knee, ankle, foot and all associated elements were prepped and draped in the usual sterile fashion.  Pause for the cause occurred and all present were in agreement.  After the prep and the pause for the cause, we exsanguinated the limb and inflated the tourniquet.  We started with plantar fascia release.  A medial incision 3 cm in length was made over the plantar fascia medially.  We exposed the plantar fascia and protected it with Eric  retractors above and below.  We released the fascia which was very tight and it  at least 15 mm.  We irrigated and closed in layers.      We turned our attention to the tendo Achilles.  We made an 8 cm longitudinal medial incision and exposed the tendo Achilles and the plantaris.  We created a Z-lengthening of the tendo Achilles and went from the tendo Achilles 2 and 3 cm.  We sutured in place with Ethibond and closed in layers.      We now had much improvement in the cavus of the foot and the equinus of the foot, but more correction was required.  We turned our attention to the left calcaneus where we made a small incision and identified the staple that had been placed there as a child.  This was in the tuberosity of the calcaneus and required that we dissect over the surface of the staple because significant bone growth had taken place.  We removed the staple.      A secondary incision was made over the mid substance of the calcaneus.  We exposed the calcaneus generously below the peroneal tendons.  We placed retractors above and below the calcaneus and we created a lateral closing wedge osteotomy leaving the medial cortex intact.  We closed down the calcaneal osteotomy and placed 2 medium Mckenna francisco in the position holding this secure.  This improved the calcaneal varus very nicely.  We turned our attention to the midfoot where we made a transverse incision and identified the cuboid and navicular.  We did a dorsolateral closing wedge osteotomy with 2 cuts from anterolateral to posteromedial leaving the medial hinge intact.  We removed the wedge of bone and we completed the correction of the forefoot on the midfoot by combination of adduction and dorsiflexion.  We secured this with 2 additional wide Mckenna francisco.      We then turned our attention to the first ray which was significantly plantarflexed with a large callosity over the metatarsal head.  We exposed the base of the metatarsal through  a dorsomedial incision and subperiosteal exposure.  We removed a 10 degree wafer of bone from the dorsum leaving the anterior cortex intact.  We dorsiflexed the metatarsal head and we secured the osteotomy with a cannulated 3.5 mm screw.  We now had excellent correction of all elements of the foot deformity.  We had planned and noted that the foot was extremely externally rotated relative to the knee.      We exposed the distal medial tibia through a 12 cm longitudinal incision and subperiosteal exposure.  We placed 2 Mohsen wires from front to back at crossing angles 20 degrees so as to check our derotation.  We did a transverse osteotomy of the distal metaphysis of the left tibia and we internally rotated the distal fragment of the tibia and the foot as 1 unit after a score nichole was made longitudinally.  We secured this osteotomy with a medial distal tibial locking plate.  We placed a total of six 2.7 mm screws distal to the osteotomy and five 3.5 mm cortex screws proximal to the osteotomy with compression applied to the osteotomy site.  We then removed the Mohsen wires.  We irrigated all wounds and closed all wounds in layers.  We placed the patient in a short leg Yousif Vegas dressing and returned him to recovery in good condition.  There were no operative complications noted.        A 22 modifier is added.  This complex series of procedures was necessary to correct lifelong deformities secondary to polio.  These deformities required additional preoperative time, preoperative planning and additional intraoperative execution and skill and experience.  All of these elements required a combined approach for success for the patient.         NICHOLE MENG MD             D: 10/26/2017 19:25   T: 10/27/2017 02:20   MT:       Name:     AASHISH SAENZ   MRN:      [id number]        Account:        [id number]   :      1965           Procedure Date: 10/26/2017      Document: [id number]

## 2017-10-27 NOTE — PROGRESS NOTES
REGIONAL ANESTHESIA PAIN SERVICE  SUBJECTIVE: Reports adequate pain control with analgesics listed below and L) popliteal nerve block injection, rating pain 2/10 at rest and 5/10 with activity.  Patient is tolerating regular diet, denies nausea.  Denies any weakness, paresthesias, circumoral numbness, metallic taste or tinnitus.       Clinically Aligned Pain Assessment (CAPA):  Comfort (How is your pain?): Comfortably manageable  Change in Pain (Since your last medication/intervention?): About the same  Pain Control (How are your pain treatments working?):  Partially effective pain control    Medications related to Pain Management (Future)    Start     Dose/Rate Route Frequency Ordered Stop    10/29/17 1100  acetaminophen (TYLENOL) tablet 650 mg      650 mg Oral EVERY 4 HOURS PRN 10/26/17 1326      10/27/17 0800  aspirin EC EC tablet 325 mg      325 mg Oral DAILY 10/26/17 1434      10/27/17 0000  oxyCODONE (ROXICODONE) 5 MG IR tablet      5-10 mg Oral EVERY 3 HOURS PRN 10/27/17 0657      10/26/17 2000  gabapentin (NEURONTIN) capsule 300 mg      300 mg Oral 2 TIMES DAILY 10/26/17 1326 10/29/17 1959    10/26/17 2000  senna-docusate (SENOKOT-S;PERICOLACE) 8.6-50 MG per tablet 1-2 tablet      1-2 tablet Oral 2 TIMES DAILY 10/26/17 1434      10/26/17 1600  acetaminophen (TYLENOL) tablet 975 mg      975 mg Oral EVERY 8 HOURS 10/26/17 1326 10/29/17 1559    10/26/17 1446  HYDROmorphone (PF) (DILAUDID) injection 0.3-0.5 mg      0.3-0.5 mg Intravenous EVERY 2 HOURS PRN 10/26/17 1454      10/26/17 1434  lidocaine 1 % 1 mL      1 mL Other EVERY 1 HOUR PRN 10/26/17 1434      10/26/17 1434  lidocaine (LMX4) kit       Topical EVERY 1 HOUR PRN 10/26/17 1434      10/26/17 1326  oxyCODONE (ROXICODONE) IR tablet 5-10 mg      5-10 mg Oral EVERY 3 HOURS PRN 10/26/17 1326      10/26/17 1326  diphenhydrAMINE (BENADRYL) capsule 25 mg      25 mg Oral EVERY 6 HOURS PRN 10/26/17 1326      10/26/17 1326  cyclobenzaprine (FLEXERIL) tablet 10 mg  "     10 mg Oral 3 TIMES DAILY PRN 10/26/17 1326      10/26/17 1234  bupivacaine liposome (EXPAREL) LONG ACTING injection was administered into the infiltration site to produce postsurgical analgesia. Duration of action is up to 72 hours, and other \"chely\" medications should not be given for 96 hours with the exception of the lidocaine 5% patch (LIDODERM) and the lidocaine 10mg in potassium infusions. This entry is for INFORMATION ONLY.       Does not apply CONTINUOUS PRN 10/26/17 1234 10/30/17 1233          OBJECTIVE:    Blood pressure 112/62, pulse 80, temperature 97.3  F (36.3  C), temperature source Oral, resp. rate 15, height 1.702 m (5' 7.01\"), weight 97.4 kg (214 lb 11.7 oz), SpO2 96 %.        ASSESSMENT/PLAN:    Erin Corcoran is a 51 year old male with h/o polio, now POD #1 s/p  OPEN REDUCTION INTERNAL FIXATION TIBIA, LENGTHEN TENDON ACHILLES, ARTHRODESIS FOOT  LENGTHEN TENDON ACHILLES with single shot L) popliteal nerve block injection.  Total bupivacaine 0.25% with epinephrine 1:200,000 20 mL, then liposome bupivacaine (Exparel) 1.3% 10mL administered 10/26/17 for postop pain control.  Pt is participating in therapies.  No evidence of adverse side effects associated with L) popliteal nerve block injection.  Pt acheiving adequate pain control, with nerve block, oral and IV analgesics.  Anticipate up to 72 hours of pain control with long-acting local anesthetic. Additionally, pt will continue to require opioid/nonopioid analgesics for visceral and muscle pain not controlled with local anesthetic.      - NO other local anesthetic use within 96 hours of liposome bupivacaine (Exparel)  - patient received verbal and written instructions about liposome bupivacaine and counseling about pharmacologic and nonpharmacologic measures for acute postoperative pain management  - please call if questions or concerns      MERE Logan CNP  Regional Anesthesia Pain Service  10/27/2017 7:25 AM    24 hour Job " Code Pager.  For in-house use only.     Dial * * [phone] and  [place]:  [phone]  [place]: [phone]  Peds:  [phone]  Enter call-back number  May text page using [employer], but NOT [employer].

## 2017-10-27 NOTE — PLAN OF CARE
Problem: Surgery Nonspecified (Adult)  Goal: Signs and Symptoms of Listed Potential Problems Will be Absent, Minimized or Managed (Surgery Nonspecified)  Signs and symptoms of listed potential problems will be absent, minimized or managed by discharge/transition of care (reference Surgery Nonspecified (Adult) CPG).   Outcome: No Change            VS:    VSS. Capnography on until 1430 10/27. Pt has signs of sleep apnea (snoring), so please monitor oxygen overnight/breathing overnight.   Output:    Pt voiding via cullen catheter in adequate amounts. Cullen to be pulled in early morning. LBM 10/25 per pt report, not passing flatus; BS hypoactive.   Activity:    Pt bedrest due to LLE numbness/absence of sensation. Up with assist tomorrow. NWB on LLE.   Skin: Skin is intact except for incisions, UTV under LLE cast. Skin is warm and dry.   Pain:    Pt denied pain throughout the entire shift. Oxycodone available when pain begins. Given scheduled tylenol and gabapentin.   CMS:    CMS intact except for LLE numbness and absence of sensation. Pt able to wiggle toes on R side but unable to wiggle toes on L side. Unable to assess DP on L foot, DP +2 on R foot.    Dressing:    Cast to LLE CDI, no drainage visible.    Diet:    Pt tolerating clear liquid diet, advanced to full liquids. Advance to regular diet for breakfast. Denies N/V. Adequate intake of PO fluids.   LDA:    PIV infusing normal saline at 100 mL/hr.    Equipment:    IV pole and personal belongings in room.   Plan:    Continue to monitor patient and manage pain as needed. Remove cullen catheter in morning. Assist pt to ambulate tomorrow.   Additional Info:    Pt from [place], but no S/S of [facility]. Speaks good English. Wife is at bedside.

## 2017-10-27 NOTE — PROGRESS NOTES
"Orthopaedic Surgery Progress Note     Subjective/Events: No acute overnight events. Pain controlled. Tolerating PO, cullen. Denies numbness, tingling.    Objective:  /62 (BP Location: Right arm)  Pulse 80  Temp 97.3  F (36.3  C) (Oral)  Resp 15  Ht 1.702 m (5' 7.01\")  Wt 97.4 kg (214 lb 11.7 oz)  SpO2 96%  BMI 33.62 kg/m2  Temp (24hrs), Av.2  F (36.2  C), Min:95.4  F (35.2  C), Max:98.6  F (37  C)    Gen: A&Ox3, no acute distress  CV: 2+ dp/pt pulses, capillary refill < 2sec  Resp: breathing equal and non-labored, no wheezing  LLE: splint c/d/i, 4/5 EHL FHL, SILT in toes, toes wwp    Assessment: 51M s/p L tibia BREE, triple arthrodesis, PTT transfer, achilles lengthening  Plan:  NWB LLE   x 4 wks  LLE splint to remain c/d/i until FU    -Disposition: DC home pending PT, today or tmrw  -FU 1 wk Dr. Tonya Hurst MD  PGY-4  [phone]        "

## 2017-10-27 NOTE — PROGRESS NOTES
Care Coordinator- Discharge Planning     Admission Date/Time:  10/26/2017  Attending MD:  Anshul Castaneda MD     Data  Date of initial CC assessment:  10/27/2017  Chart reviewed, discussed with interdisciplinary team.   Patient was admitted for:   1. Status post osteotomy         Assessment  Concerns with insurance coverage for discharge needs: None.  Current Living Situation: Patient lives with family.  Support System: Supportive and Involved  Services Involved: DME  Transportation: Family or Friend will provide  Barriers to Discharge: None        Coordination of Care and Referrals: Met with patient and family member at bedside to discuss renting a wheelchair while he is here in [place]. This writer spoke with [employer] at [phone]. An account was set up for the patient. Patient is discharging to his apartment [facility] will call the patient to set up delivery. Patient's address while in [place] is [id number] [place] [address]  [id number], [facility] [id number]. No further discharge concerns at this time. CC to follow as needed.      Plan  Anticipated Discharge Date:  10/27/2017  Anticipated Discharge Plan:  Home    CTS Handoff completed:  YES    Dayanara Segura RN, BSN  Care Coordinator, 8A  Phone [phone]  Pager [phone]

## 2017-10-27 NOTE — PROGRESS NOTES
"[facility] Internal Medicine Progress Note            Interval History:   Record reviewed.  Discussed with Dr. Esparza.  Seen with RN.  Clinically doing well.   Adequate pain control.   No CP, SOB, cough, nausea, reflux, abd pain or distention.  No flatus.  BM last night.  No void since cullen out. Subsequent void 300 with bladder scan 24 cc's.  Intermittent O2 during night. Isolated sat 86%.           Medications:   All medications reviewed today          Physical Exam:   Blood pressure 111/64, pulse 60, temperature 97.1  F (36.2  C), resp. rate 16, height 1.702 m (5' 7.01\"), weight 97.4 kg (214 lb 11.7 oz), SpO2 95 %.    Intake/Output Summary (Last 24 hours) at 10/27/17 1409  Last data filed at 10/27/17 1125   Gross per 24 hour   Intake              357 ml   Output             3450 ml   Net            -3093 ml       General:  Alert.  Appropriate.  No distress.  Off O2 97% RA.      Heent:      Neck:    Skin:    Chest:  clear    Cardiac:  Reg without gallop, murmur.  No JVD.     Abdomen:  Non distended, soft, non-tender.  BS normal.     Extremities:  Perfused.  No edema, calf, posterior thigh tenderness to suggest DVT.     Neuro:            Data:     Results for orders placed or performed during the hospital encounter of 10/26/17 (from the past 24 hour(s))   Internal Medicine Adult IP Consult for [facility]: Patient to be seen: Routine within 24 hrs; Call back #: [phone]; s/p foot osteomy, hx of polio,  medical mgmt; Consultant may enter orders: Yes    Narrative    Phil Esparza MD     10/26/2017  3:37 PM  IM consult dictated   XR Ankle Port Left 2 Views    Narrative    XR ANKLE PORT LT 2 VW, XR FOOT PORT LT 2 VW   10/26/2017 3:10 PM     HISTORY: s/p foot ostoetomy    COMPARISON: None.      Impression    IMPRESSION: 3 views of the ankle and foot. Distal tibial osteotomy  with sideplate and screws. 2 stable type device is in the calcaneus  and 2 stable-type devices in the midfoot. First " metatarsal osteotomy  with screw.    TARIK LARSON MD   XR Foot Port Left 2 Views    Narrative    XR ANKLE PORT LT 2 VW, XR FOOT PORT LT 2 VW   10/26/2017 3:10 PM     HISTORY: s/p foot ostoetomy    COMPARISON: None.      Impression    IMPRESSION: 3 views of the ankle and foot. Distal tibial osteotomy  with sideplate and screws. 2 stable type device is in the calcaneus  and 2 stable-type devices in the midfoot. First metatarsal osteotomy  with screw.    TARIK LARSON MD   CBC with platelets   Result Value Ref Range    WBC 8.5 4.0 - 11.0 10e9/L    RBC Count 4.84 4.4 - 5.9 10e12/L    Hemoglobin 13.5 13.3 - 17.7 g/dL    Hematocrit 41.2 40.0 - 53.0 %    MCV 85 78 - 100 fl    MCH 27.9 26.5 - 33.0 pg    MCHC 32.8 31.5 - 36.5 g/dL    RDW 13.3 10.0 - 15.0 %    Platelet Count 188 150 - 450 10e9/L   Basic metabolic panel   Result Value Ref Range    Sodium 138 133 - 144 mmol/L    Potassium 4.2 3.4 - 5.3 mmol/L    Chloride 105 94 - 109 mmol/L    Carbon Dioxide 30 20 - 32 mmol/L    Anion Gap 3 3 - 14 mmol/L    Glucose 172 (H) 70 - 99 mg/dL    Urea Nitrogen 7 7 - 30 mg/dL    Creatinine 0.68 0.66 - 1.25 mg/dL    GFR Estimate >90 >60 mL/min/1.7m2    GFR Estimate If Black >90 >60 mL/min/1.7m2    Calcium 8.1 (L) 8.5 - 10.1 mg/dL                Assessment and Plan:   1.  Status post left tibia internal fixation, left tibia arthrodesis, left posterior tibia tendon transfer with left Achilles lengthening for the treatment of chronic deformity related to polio.  Doing well.  Adequate pain control.   2. Acute blood loss without anemia.  3. Borderline hypertension on no medications.  Adequate.   4. Obesity.   5. Possible clinical undiagnosed ADAM  (snoring pre op).  Potential basis for transient over night hypoxemia (in addition to opiate effect).  Adequate presently.  No opiate associated sedation.               PLAN:  1)  To try and sleep with sat monitor RA this afternoon.  2)  Clinically at present appears stable for DC.  Recommended out  patient sleep study with PMD.  3)  Opiates per ortho with plan to taper.  Bowel meds.   Disposition Plan   Expected discharge today.      Entered: Migue Foss 10/27/2017, 2:09 PM     ADD:  Maintained sat > 88% RA with sleep 11/2 hrs (high 90's).  OK medically for DC.           Attestation:  I have reviewed today's vital signs, notes, medications, labs and imaging.     Migue Foss MD

## 2017-10-27 NOTE — PROGRESS NOTES
10/27/17 0800   Quick Adds   Type of Visit Initial PT Evaluation   Living Environment   Lives With spouse   Living Arrangements apartment   Home Accessibility no concerns   Number of Stairs to Enter Home 0   Number of Stairs Within Home 0   Living Environment Comment Pt is from [facility], staying here locally in apartment with wife radha 24/7 to assist. Elevator to access apartment, no stairs.    Self-Care   Usual Activity Tolerance good   Current Activity Tolerance fair   Regular Exercise no   Equipment Currently Used at Home none   Activity/Exercise/Self-Care Comment Indep self cares, works as a  for [employer] equipment   Functional Level Prior   Ambulation 0-->independent   Transferring 0-->independent   Toileting 0-->independent   Bathing 0-->independent   Dressing 0-->independent   Eating 0-->independent   Communication 0-->understands/communicates without difficulty   Swallowing 0-->swallows foods/liquids without difficulty   Cognition 0 - no cognition issues reported   Fall history within last six months no   Which of the above functional risks had a recent onset or change? ambulation   Prior Functional Level Comment Ind functional mobility, no equipment   General Information   Onset of Illness/Injury or Date of Surgery - Date 10/26/17   Patient/Family Goals Statement To be able to walk easier, faster   Pertinent History of Current Problem (include personal factors and/or comorbidities that impact the POC) 51M s/p L tibia BREE, triple arthrodesis, PTT transfer, achilles lengthening   Weight-Bearing Status - LLE nonweight-bearing   Weight-Bearing Status - RLE full weight-bearing   Cognitive Status Examination   Orientation orientation to person, place and time   Level of Consciousness alert   Follows Commands and Answers Questions 100% of the time;able to follow multistep instructions   Personal Safety and Judgment intact   Memory intact   Pain Assessment   Patient Currently in Pain No  "  Integumentary/Edema   Integumentary/Edema no deficits were identifed   Posture    Posture Not impaired   Range of Motion (ROM)   ROM Comment ROM L ankle limited secondary to surgery; knee flex/ext WFL passivly, unable to activate muscles secondary to postpolio   Strength   Strength Comments L knee flex/ext 1/5 compensates with hip abductors and glutes   Bed Mobility   Bed Mobility Comments INdep   Transfer Skills   Transfer Comments Indep   Gait   Gait Comments CGA  feet   Balance   Balance Comments Standing dynamic balance impaired secondary to NWB L LE   Sensory Examination   Sensory Perception Comments Reports no sensation changes   Coordination   Coordination no deficits were identified   Muscle Tone   Muscle Tone no deficits were identified   General Therapy Interventions   Planned Therapy Interventions balance training;gait training;neuromuscular re-education;ROM;strengthening;transfer training;home program guidelines;progressive activity/exercise;risk factor education   Clinical Impression   Criteria for Skilled Therapeutic Intervention yes, treatment indicated   PT Diagnosis Impaired functional mobility    Influenced by the following impairments Decreased ROM, strength L LE, NWB, difficulty ambulating, balance impaired   Functional limitations due to impairments decreased indep with mobility   Clinical Presentation Stable/Uncomplicated   Clinical Presentation Rationale Uncomplicated surgery, postpolio syndrome   Clinical Decision Making (Complexity) Low complexity   Therapy Frequency` daily   Predicted Duration of Therapy Intervention (days/wks) 2 days   Anticipated Equipment Needs at Discharge forearm crutches   Anticipated Discharge Disposition Home with Assist   Risk & Benefits of therapy have been explained Yes   Patient, Family & other staff in agreement with plan of care Yes   [facility] AM-PAC TM \"6 Clicks\"   2016, Trustees of [facility], under license to [employer].  All " "rights reserved.   6 Clicks Short Forms Basic Mobility Inpatient Short Form   [facility] AM-PAC  \"6 Clicks\" V.2 Basic Mobility Inpatient Short Form   1. Turning from your back to your side while in a flat bed without using bedrails? 4 - None   2. Moving from lying on your back to sitting on the side of a flat bed without using bedrails? 4 - None   3. Moving to and from a bed to a chair (including a wheelchair)? 4 - None   4. Standing up from a chair using your arms (e.g., wheelchair, or bedside chair)? 4 - None   5. To walk in hospital room? 3 - A Little   6. Climbing 3-5 steps with a railing? 2 - A Lot   Basic Mobility Raw Score (Score out of 24.Lower scores equate to lower levels of function) 21   Total Evaluation Time   Total Evaluation Time (Minutes) 9     "

## 2017-10-27 NOTE — PLAN OF CARE
Problem: Patient Care Overview  Goal: Individualization & Mutuality  Outcome: No Change  [id number] Shift Summary: VSS. Pt states pain is tolerable and pain control is adequate. Given 10 mg oxycodone x 2 with good relief. Drsg CDI. CMS intact. ALEYDA pulse on operative leg due to cast; toes warm and patient able to wiggle toes. Denies cp, sob, calf pain, nausea. Tolerating regular diet. Lungs CTA. Voiding adequate amounts of urine via cullen. Cullen removed at 0450; pt is due to void. Able to make needs known. Call light within reach. Continue to monitor.

## 2017-10-27 NOTE — PLAN OF CARE
Problem: Patient Care Overview  Goal: Plan of Care/Patient Progress Review  Occupational Therapy Discharge Summary     Reason for therapy discharge:    Discharged to home.     Progress towards therapy goal(s). See goals on Care Plan in [facility] electronic health record for goal details.  Pt. S/Rachel with BADLs and functional mobility, ambulating with walker.  Pt. educated on and provided with handouts for recommended OT equipment for home prn.     Therapy recommendation(s):    No further therapy is recommended.

## 2017-11-06 ENCOUNTER — OFFICE VISIT (OUTPATIENT)
Dept: ORTHOPEDICS | Facility: CLINIC | Age: 52
End: 2017-11-06

## 2017-11-06 DIAGNOSIS — Z98.890 STATUS POST OSTEOTOMY: Primary | ICD-10-CM

## 2017-11-06 DIAGNOSIS — M21.70 LEG LENGTH DIFFERENCE, ACQUIRED: Primary | ICD-10-CM

## 2017-11-06 RX ORDER — TRAMADOL HYDROCHLORIDE 50 MG/1
50 TABLET ORAL EVERY 6 HOURS PRN
Qty: 40 TABLET | Refills: 0 | Status: SHIPPED | OUTPATIENT
Start: 2017-11-06 | End: 2017-11-13

## 2017-11-06 NOTE — LETTER
Date:November 10, 2017      Patient was self referred, no letter generated. Do not send.        [facility] Health Information

## 2017-11-06 NOTE — LETTER
11/6/2017       RE: Erin Corcoran  [address] [facility] [address]     Dear Colleague,    Thank you for referring your patient, Erin Corcoran, to the [facility] at [facility]. Please see a copy of my visit note below.    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      TREATMENT:  Surgery performed on 10/26/2017.   1.  Left plantar fascia lengthening.   2.  Left no Achilles lengthening.   3.  Left calcaneal Dozier osteotomy with internal fixation.   4.  Removal of staple from left calcaneus.   5.  Left mid foot dorsal lateral closing wedge osteotomy with internal fixation.   6.  Left first metatarsal osteotomy.   7.  Left distal tibial derotational osteotomy.      HISTORY OF PRESENT ILLNESS:  Erin Corcoran is a 51-year-old male with left lower extremity sequellae of polio leading to severe cavovarus foot deformity who is now 10 days status post the above-stated procedure.  He states that the pain is a lot less than he anticipated and he only took oxycodone the day after surgery and has not taken it since.  He does not want something very strong and is requesting a more mild pain medication.  He has been having difficulty sleeping at night despite taking the gabapentin.  He denies any fever or chills and denies any drainage from the wound, so he just came out of his bulky [place] splint today.  He has many questions today regarding when he will be able to drive and when he will be able to travel to [place] for his son's graduation as well as back to [facility].  He is planning to be in [place] for his son's graduation on 12/15 and then traveling back to [place] on 12/18/2017.      PHYSICAL EXAMINATION:   GENERAL:  Well developed, well-nourished male in no acute distress.   FOCUSED EXAM:  Of the left lower extremity after removing the splint reveals well approximated foot and ankle incisions  without any erythema or drainage.  There is significant swelling and ecchymosis which is expected at this period postop.  His feet are warm and well perfused with a palpable dorsalis pedis pulse.  He is able to fire his tibialis anterior, gastrocsoleus complex and extensor hallucis longus.  Only 3/5 strength.  He has significant clinical improvement in the appearance of his foot and it is in much more neutral alignment than preop.      IMAGING:  Ankle and foot x-rays are reviewed and reveals significant interval improvement in ankle and foot alignment with more neutral ankle and foot alignment.  All instrumentation is well placed without any evidence of osteolysis or wear, no zenobia-implant fracture.      ASSESSMENT:  51-year-old male with left lower extremity sequelae of polio leading to left foot and ankle severe cavovarus foot deformity, now status post above-stated procedure on 10/26/2017.      PLAN:   1.  We will transition him into a soft dressing and a Cam boot today.   2.  We will prescribe him tramadol for pain control and to help with sleep.   3.  He will follow up in 1 week for suture removal as they are not ready to be removed at this time.   4.  We will arrange for him to come back for fitting of his AFO around the first week of December planning to be completely fitted before leaving back to [place]  on 12/18/2017.  We anticipate driving at 2 months.  He will participate in physical therapy to regain the ankle range of motion.      The patient seen and examined with Dr. Castaneda.           Again, thank you for allowing me to participate in the care of your patient.      Sincerely,    Anshul Castaneda MD

## 2017-11-06 NOTE — PROGRESS NOTES
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      TREATMENT:  Surgery performed on 10/26/2017.   1.  Left plantar fascia lengthening.   2.  Left no Achilles lengthening.   3.  Left calcaneal Dozier osteotomy with internal fixation.   4.  Removal of staple from left calcaneus.   5.  Left mid foot dorsal lateral closing wedge osteotomy with internal fixation.   6.  Left first metatarsal osteotomy.   7.  Left distal tibial derotational osteotomy.      HISTORY OF PRESENT ILLNESS:  Erin Corcoran is a 51-year-old male with left lower extremity sequellae of polio leading to severe cavovarus foot deformity who is now 10 days status post the above-stated procedure.  He states that the pain is a lot less than he anticipated and he only took oxycodone the day after surgery and has not taken it since.  He does not want something very strong and is requesting a more mild pain medication.  He has been having difficulty sleeping at night despite taking the gabapentin.  He denies any fever or chills and denies any drainage from the wound, so he just came out of his bulky [place] splint today.  He has many questions today regarding when he will be able to drive and when he will be able to travel to [place] for his son's graduation as well as back to [facility].  He is planning to be in [place] for his son's graduation on 12/15 and then traveling back to [facility] on 12/18/2017.      PHYSICAL EXAMINATION:   GENERAL:  Well developed, well-nourished male in no acute distress.   FOCUSED EXAM:  Of the left lower extremity after removing the splint reveals well approximated foot and ankle incisions without any erythema or drainage.  There is significant swelling and ecchymosis which is expected at this period postop.  His feet are warm and well perfused with a palpable dorsalis pedis pulse.  He is able to fire his tibialis anterior, gastrocsoleus complex and extensor hallucis longus.   Only 3/5 strength.  He has significant clinical improvement in the appearance of his foot and it is in much more neutral alignment than preop.      IMAGING:  Ankle and foot x-rays are reviewed and reveals significant interval improvement in ankle and foot alignment with more neutral ankle and foot alignment.  All instrumentation is well placed without any evidence of osteolysis or wear, no zenobia-implant fracture.      ASSESSMENT:  51-year-old male with left lower extremity sequelae of polio leading to left foot and ankle severe cavovarus foot deformity, now status post above-stated procedure on 10/26/2017.      PLAN:   1.  We will transition him into a soft dressing and a Cam boot today.   2.  We will prescribe him tramadol for pain control and to help with sleep.   3.  He will follow up in 1 week for suture removal as they are not ready to be removed at this time.   4.  We will arrange for him to come back for fitting of his AFO around the first week of December planning to be completely fitted before leaving back to [facility] on 12/18/2017.  We anticipate driving at 2 months.  He will participate in physical therapy to regain the ankle range of motion.      The patient seen and examined with Dr. Castaneda.

## 2017-11-13 ENCOUNTER — OFFICE VISIT (OUTPATIENT)
Dept: ORTHOPEDICS | Facility: CLINIC | Age: 52
End: 2017-11-13

## 2017-11-13 DIAGNOSIS — M21.70 LEG LENGTH DIFFERENCE, ACQUIRED: ICD-10-CM

## 2017-11-13 RX ORDER — TRAMADOL HYDROCHLORIDE 50 MG/1
50 TABLET ORAL EVERY 6 HOURS PRN
Qty: 40 TABLET | Refills: 0 | Status: SHIPPED | OUTPATIENT
Start: 2017-11-13

## 2017-11-13 NOTE — MR AVS SNAPSHOT
After Visit Summary   2017    Erin Corcoran    MRN: [id number]           Patient Information     Date Of Birth          1965        Visit Information        Provider Department      2017 1:30 PM Kenia Parsons APRN CNP [facility]        Today's Diagnoses     Leg length difference, acquired           Follow-ups after your visit        Your next 10 appointments already scheduled     Dec 18, 2017 11:45 AM CST   (Arrive by 11:30 AM)   Return Visit with MD ABEL Ruelas [facility] ([facility])    [address] [place] [id number] [phone]              Who to contact     Please call your clinic at [phone] to:    Ask questions about your health    Make or cancel appointments    Discuss your medicines    Learn about your test results    Speak to your doctor   If you have compliments or concerns about an experience at your clinic, or if you wish to file a complaint, please contact [facility] Patient Relations at [phone] or email us at [email or web address]         Additional Information About Your Visit        MyChart Information     [employer] is an electronic gateway that provides easy, online access to your medical records. With [employer], you can request a clinic appointment, read your test results, renew a prescription or communicate with your care team.     To sign up for [facility] visit the website at [email or web address]   You will be asked to enter the access code listed below, as well as some personal information. Please follow the directions to create your username and password.     Your access code is: [email or web address]  Expires: 2018  3:27 PM     Your access code will  in 90 days. If you need help or a new code, please contact your [facility] or call [phone] for assistance.        Care  EveryWhere ID     This is your Care EveryWhere ID. This could be used by other organizations to access your [facility] medical records  [id number]         Blood Pressure from Last 3 Encounters:   10/27/17 148/82   10/24/17 (!) 156/96    Weight from Last 3 Encounters:   10/26/17 97.4 kg (214 lb 11.7 oz)   10/24/17 97.9 kg (215 lb 14.4 oz)   10/23/17 97.1 kg (214 lb)              Today, you had the following     No orders found for display         Where to get your medicines      Some of these will need a paper prescription and others can be bought over the counter.  Ask your nurse if you have questions.     Bring a paper prescription for each of these medications     traMADol 50 MG tablet          Primary Care Provider Fax #    Provider Not In System [phone]                Equal Access to Services     BLADE VILLANUEVA : Etienne Champion, rodri eason, lillie duran, marlen connelly. [facility] [phone].    ATENCIÓN: Si habla español, tiene a hollis disposición servicios gratuitos de asistencia lingüística. Gutierrez al [phone].    We comply with applicable federal civil rights laws and [place] laws. We do not discriminate on the basis of race, color, national origin, age, disability, sex, sexual orientation, or gender identity.            Thank you!     Thank you for choosing [facility]  for your care. Our goal is always to provide you with excellent care. Hearing back from our patients is one way we can continue to improve our services. Please take a few minutes to complete the written survey that you may receive in the mail after your visit with us. Thank you!             Your Updated Medication List - Protect others around you: Learn how to safely use, store and throw away your medicines at www.disposemymeds.org.          This list is accurate as of: 11/13/17  3:48 PM.  Always use your most recent med list.                   Brand Name  Dispense Instructions for use Diagnosis    acetaminophen 325 MG tablet    TYLENOL    100 tablet    Take 2 tablets (650 mg) by mouth every 4 hours as needed for other (surgical pain)    Status post osteotomy       aspirin 325 MG EC tablet     28 tablet    Take 1 tablet (325 mg) by mouth daily for 28 days    Status post osteotomy       gabapentin 300 MG capsule    NEURONTIN    28 capsule    Take 1 capsule (300 mg) by mouth 2 times daily for 14 days    Status post osteotomy       oxyCODONE IR 5 MG tablet    ROXICODONE    70 tablet    Take 1-2 tablets (5-10 mg) by mouth every 3 hours as needed for moderate to severe pain    Status post osteotomy       senna-docusate 8.6-50 MG per tablet    SENOKOT-S;PERICOLACE    40 tablet    Take 1-2 tablets by mouth 2 times daily as needed for constipation    Status post osteotomy       traMADol 50 MG tablet    ULTRAM    40 tablet    Take 1 tablet (50 mg) by mouth every 6 hours as needed for moderate pain    Leg length difference, acquired

## 2017-11-13 NOTE — LETTER
Date:November 14, 2017      Patient was self referred, no letter generated. Do not send.        [facility] Health Information

## 2017-11-13 NOTE — PROGRESS NOTES
Erin is seen in follow up of his left foot/ankle deformity correction with plantar fascia lengthening.  Wounds/incisions are all healing and intact and improved compared to previous visit. Swelling is improved but still signficiant enough to delay AFO fitting. Will attempt tomorrow with strict elevation today. If edema continues will send order to a local orthotist in [place]. He will follow up in December for a 2v foot xray on arrival.

## 2017-11-13 NOTE — LETTER
11/13/2017       RE: Erin Corcoran  [address] [facility] [address]     Dear Colleague,    Thank you for referring your patient, Erin Corcoran, to the [facility] at [facility]. Please see a copy of my visit note below.    Erin is seen in follow up of his left foot/ankle deformity correction with plantar fascia lengthening.  Wounds/incisions are all healing and intact and improved compared to previous visit. Swelling is improved but still signficiant enough to delay AFO fitting. Will attempt tomorrow with strict elevation today. If edema continues will send order to a local orthotist in [place]. He will follow up in December for a 2v foot xray on arrival.    Again, thank you for allowing me to participate in the care of your patient.      Sincerely,    MERE Saez CNP

## 2017-12-04 ENCOUNTER — TELEPHONE (OUTPATIENT)
Dept: ORTHOPEDICS | Facility: CLINIC | Age: 52
End: 2017-12-04

## 2017-12-04 DIAGNOSIS — M21.962 ANKLE DEFORMITY, LEFT: Primary | ICD-10-CM

## 2017-12-04 NOTE — TELEPHONE ENCOUNTER
Erin is from [facility], he underwent left ankle corrective surgery with Dr Castaneda on 10/26/17, last seen in clinic 11/13/17.  Pt traveled to [place] where his son is staying, asked about orthotist in [place].  Dr Castaneda wrote that [facility] [place], Fax # [phone]  He needs a leafspring AFO.  Referral was faxed and voicemail left with Erin about the plan.

## 2017-12-13 DIAGNOSIS — Z98.890 STATUS POST OSTEOTOMY: Primary | ICD-10-CM

## 2017-12-18 ENCOUNTER — RADIANT APPOINTMENT (OUTPATIENT)
Dept: GENERAL RADIOLOGY | Facility: CLINIC | Age: 52
End: 2017-12-18
Attending: ORTHOPAEDIC SURGERY

## 2017-12-18 ENCOUNTER — OFFICE VISIT (OUTPATIENT)
Dept: ORTHOPEDICS | Facility: CLINIC | Age: 52
End: 2017-12-18

## 2017-12-18 DIAGNOSIS — Z98.1 S/P ANKLE ARTHRODESIS: Primary | ICD-10-CM

## 2017-12-18 DIAGNOSIS — Z98.890 STATUS POST OSTEOTOMY: ICD-10-CM

## 2017-12-18 RX ORDER — OXYCODONE HYDROCHLORIDE 5 MG/1
5 TABLET ORAL EVERY 6 HOURS PRN
Qty: 20 TABLET | Refills: 0 | Status: SHIPPED | OUTPATIENT
Start: 2017-12-18

## 2017-12-18 RX ORDER — HYDROCODONE BITARTRATE AND ACETAMINOPHEN 5; 325 MG/1; MG/1
1 TABLET ORAL EVERY 8 HOURS PRN
Qty: 10 TABLET | Refills: 0 | Status: SHIPPED | OUTPATIENT
Start: 2017-12-18

## 2017-12-18 NOTE — LETTER
Date:December 20, 2017      Patient was self referred, no letter generated. Do not send.        [facility] Health Information

## 2017-12-18 NOTE — PROGRESS NOTES
Erin is seen in follow up and is progressing well s/p triple foot arthrodesis with IF and tendon transfer with achilles lengthening.  DOS: 10/26/2017. Reports doing well without complaints. Has an AFO type brace in his shoe and is walking well.  Incisons are well healed. + CMS without acute neuro deficits. No calf pain. Still needs occasional pain medication at night.    Xrays taken:  Stable osteotomy changes in the left ankle, including the  calcaneus, distal tibia, and first metatarsal.    Dx:   1. Healing triple foot arthrodesis with tendon transfer/achilles lengthening    Plan:  1. Continue gentle stretching/therapy.  2. F/u 3- 6 months for xrays or sooner if needed.    I, Dr. Anshul Castaneda,  have seen and examined this patient with MERE Plata, CNP.

## 2017-12-18 NOTE — LETTER
12/18/2017       RE: Erin Corcoran  [address] [facility] [place] [facility] [address]     Dear Colleague,    Thank you for referring your patient, Erin Corcoran, to the [facility]OP[facility]IC at [facility]. Please see a copy of my visit note below.    Erin is seen in follow up and is progressing well s/p triple foot arthrodesis with IF and tendon transfer with achilles lengthening.  DOS: 10/26/2017. Reports doing well without complaints. Has an AFO type brace in his shoe and is walking well.  Incisons are well healed. + CMS without acute neuro deficits. No calf pain. Still needs occasional pain medication at night.    Xrays taken:  Stable osteotomy changes in the left ankle, including the  calcaneus, distal tibia, and first metatarsal.    Dx:   1. Healing triple foot arthrodesis with tendon transfer/achilles lengthening    Plan:  1. Continue gentle stretching/therapy.  2. F/u 3- 6 months for xrays or sooner if needed.    I, Dr. Anshul Castaneda,  have seen and examined this patient with MERE Plata, CNP.      Again, thank you for allowing me to participate in the care of your patient.      Sincerely,    Anshul Castaneda MD

## 2017-12-18 NOTE — MR AVS SNAPSHOT
After Visit Summary   2017    Erin Corcoran    MRN: [id number]           Patient Information     Date Of Birth          1965        Visit Information        Provider Department      2017 11:45 AM Anshul Castaneda MD [facility]        Today's Diagnoses     S/P ankle arthrodesis    -  1       Follow-ups after your visit        Who to contact     Please call your clinic at [phone] to:    Ask questions about your health    Make or cancel appointments    Discuss your medicines    Learn about your test results    Speak to your doctor   If you have compliments or concerns about an experience at your clinic, or if you wish to file a complaint, please contact [facility] Patient Relations at [phone] or email us at [email or web address]         Additional Information About Your Visit        MyChart Information     [employer] is an electronic gateway that provides easy, online access to your medical records. With [employer], you can request a clinic appointment, read your test results, renew a prescription or communicate with your care team.     To sign up for [employer] visit the website at [email or web address] ([email or web address]t   You will be asked to enter the access code listed below, as well as some personal information. Please follow the directions to create your username and password.     Your access code is: [email or web address]  Expires: 2018  3:27 PM     Your access code will  in 90 days. If you need help or a new code, please contact your [facility] or call [phone] for assistance.        Care EveryWhere ID     This is your Care EveryWhere ID. This could be used by other organizations to access your [facility] medical records  [id number]         Blood Pressure from Last 3 Encounters:   10/27/17 148/82   10/24/17 (!) 156/96    Weight from Last 3 Encounters:   10/26/17 97.4 kg (214 lb 11.7 oz)    10/24/17 97.9 kg (215 lb 14.4 oz)   10/23/17 97.1 kg (214 lb)              Today, you had the following     No orders found for display         Today's Medication Changes          These changes are accurate as of: 12/18/17 12:33 PM.  If you have any questions, ask your nurse or doctor.               Start taking these medicines.        Dose/Directions    HYDROcodone-acetaminophen 5-325 MG per tablet   Commonly known as:  NORCO   Used for:  S/P ankle arthrodesis   Started by:  Anshul Castaneda MD        Dose:  1 tablet   Take 1 tablet by mouth every 8 hours as needed for moderate to severe pain   Quantity:  10 tablet   Refills:  0         These medicines have changed or have updated prescriptions.        Dose/Directions    * oxyCODONE IR 5 MG tablet   Commonly known as:  ROXICODONE   This may have changed:  Another medication with the same name was added. Make sure you understand how and when to take each.   Used for:  Status post osteotomy        Dose:  5-10 mg   Take 1-2 tablets (5-10 mg) by mouth every 3 hours as needed for moderate to severe pain   Quantity:  70 tablet   Refills:  0       * oxyCODONE IR 5 MG tablet   Commonly known as:  ROXICODONE   This may have changed:  You were already taking a medication with the same name, and this prescription was added. Make sure you understand how and when to take each.   Used for:  S/P ankle arthrodesis   Changed by:  Anshul Castaneda MD        Dose:  5 mg   Take 1 tablet (5 mg) by mouth every 6 hours as needed for moderate to severe pain   Quantity:  20 tablet   Refills:  0       * Notice:  This list has 2 medication(s) that are the same as other medications prescribed for you. Read the directions carefully, and ask your doctor or other care provider to review them with you.      Stop taking these medicines if you haven't already. Please contact your care team if you have questions.     gabapentin 300 MG capsule   Commonly known as:  NEURONTIN   Stopped by:   Anshul Castaneda MD           senna-docusate 8.6-50 MG per tablet   Commonly known as:  SENOKOT-S;PERICOLACE   Stopped by:  Anshul Castaneda MD                Where to get your medicines      Some of these will need a paper prescription and others can be bought over the counter.  Ask your nurse if you have questions.     Bring a paper prescription for each of these medications     HYDROcodone-acetaminophen 5-325 MG per tablet    oxyCODONE IR 5 MG tablet                Primary Care Provider Fax #    Provider Not In System [phone]                Equal Access to Services     [facility]: Hadii natanael ku hadasho Soomaali, waaxda luqadaha, qaybta kaalmada adeegyada, waxmartina mauro . So [facility] [phone].    ATENCIÓN: Si habla español, tiene a hollis disposición servicios gratuitos de asistencia lingüística. Llame al [phone].    We comply with applicable federal civil rights laws and [place] laws. We do not discriminate on the basis of race, color, national origin, age, disability, sex, sexual orientation, or gender identity.            Thank you!     Thank you for choosing [facility]  for your care. Our goal is always to provide you with excellent care. Hearing back from our patients is one way we can continue to improve our services. Please take a few minutes to complete the written survey that you may receive in the mail after your visit with us. Thank you!             Your Updated Medication List - Protect others around you: Learn how to safely use, store and throw away your medicines at www.disposemymeds.org.          This list is accurate as of: 12/18/17 12:33 PM.  Always use your most recent med list.                   Brand Name Dispense Instructions for use Diagnosis    acetaminophen 325 MG tablet    TYLENOL    100 tablet    Take 2 tablets (650 mg) by mouth every 4 hours as needed for other (surgical pain)    Status post osteotomy        HYDROcodone-acetaminophen 5-325 MG per tablet    NORCO    10 tablet    Take 1 tablet by mouth every 8 hours as needed for moderate to severe pain    S/P ankle arthrodesis       * oxyCODONE IR 5 MG tablet    ROXICODONE    70 tablet    Take 1-2 tablets (5-10 mg) by mouth every 3 hours as needed for moderate to severe pain    Status post osteotomy       * oxyCODONE IR 5 MG tablet    ROXICODONE    20 tablet    Take 1 tablet (5 mg) by mouth every 6 hours as needed for moderate to severe pain    S/P ankle arthrodesis       traMADol 50 MG tablet    ULTRAM    40 tablet    Take 1 tablet (50 mg) by mouth every 6 hours as needed for moderate pain    Leg length difference, acquired       * Notice:  This list has 2 medication(s) that are the same as other medications prescribed for you. Read the directions carefully, and ask your doctor or other care provider to review them with you.

## 2018-06-10 ENCOUNTER — TRANSFERRED RECORDS (OUTPATIENT)
Dept: HEALTH INFORMATION MANAGEMENT | Facility: CLINIC | Age: 53
End: 2018-06-10

## 2018-07-26 ENCOUNTER — TELEPHONE (OUTPATIENT)
Dept: ORTHOPEDICS | Facility: CLINIC | Age: 53
End: 2018-07-26

## 2018-07-26 NOTE — TELEPHONE ENCOUNTER
Erin underwent left foot triple arthrodesis with Dr Castaneda on 10/26/18.  Photos and xray images from June 2018 were received.  We will have Dr Castaneda review the images and give additional recommendations next week.  Candida Lauren RN

## 2018-09-04 ENCOUNTER — TELEPHONE (OUTPATIENT)
Dept: ORTHOPEDICS | Facility: CLINIC | Age: 53
End: 2018-09-04

## 2018-09-04 NOTE — TELEPHONE ENCOUNTER
9-4-18 please contact patient with Dr moncada's opinion on imaging patient sent and plan for surgery.  Heena Fregoso, Admin Coord. II, Orthopaedics

## 2018-09-06 NOTE — TELEPHONE ENCOUNTER
Voicemail was left on beka's brother's phone since Erin and his son's phones were not in service.  Erin was instructed to send updated xray images of his ankle, and report how his ankle is feeling at this time.  Candida Lauren RN

## 2018-11-06 DIAGNOSIS — Z98.890 S/P FOOT SURGERY, LEFT: Primary | ICD-10-CM

## 2019-10-10 ENCOUNTER — DOCUMENTATION ONLY (OUTPATIENT)
Dept: CARE COORDINATION | Facility: CLINIC | Age: 54
End: 2019-10-10

## 2019-11-08 DIAGNOSIS — Z98.1 S/P ANKLE ARTHRODESIS: Primary | ICD-10-CM

## 2019-11-11 ENCOUNTER — ANCILLARY PROCEDURE (OUTPATIENT)
Dept: GENERAL RADIOLOGY | Facility: CLINIC | Age: 54
End: 2019-11-11
Attending: ORTHOPAEDIC SURGERY

## 2019-11-11 ENCOUNTER — OFFICE VISIT (OUTPATIENT)
Dept: ORTHOPEDICS | Facility: CLINIC | Age: 54
End: 2019-11-11

## 2019-11-11 DIAGNOSIS — Z98.1 HX OF ANKLE FUSION: Primary | ICD-10-CM

## 2019-11-11 DIAGNOSIS — Z98.1 S/P ANKLE ARTHRODESIS: ICD-10-CM

## 2019-11-11 NOTE — LETTER
Date:November 19, 2019      Patient was self referred, no letter generated. Do not send.        [facility] Health Information

## 2019-11-11 NOTE — NURSING NOTE
Reason For Visit:   Chief Complaint   Patient presents with     RECHECK     followup left ankle/foot arthrodesis DOS 10/26/17       There were no vitals taken for this visit.    Pain Assessment  Patient Currently in Pain: Yes  0-10 Pain Scale: 5  Primary Pain Location: (left foot/ankle)  Aggravating Factors: Walking    Camp Millie, ATC

## 2019-11-11 NOTE — LETTER
"11/11/2019       RE: Erin Corcoran  [address] [facility] [address]     Dear Colleague,    Thank you for referring your patient, Erin Corcoran, to the [facility] at [facility]. Please see a copy of my visit note below.    CC: follow up L ankle fusion 10/26/2017    HPI:  Erin is seen today in follow up of his triple foot arthrodesis with internal fixation and tendon transfer. He reports doing well and has no new concerns. He has been wearing a brace \"off and on\". No new injuries falls or trauma. Rates pain \"2\".    PMH; Reviewed from patient marcel ttoday 11/11/2019    ROS: Reviewed from patient tablet today 11/11/2019 with all systems negative except for those listed below.    PE:  Pleasant and cooperative male alert and oriented x3.Foot and ankle position is ideal. Incisions are healed. No motion noted to tibiotalar or subtalar joint. No focal areas of tenderness. Minimal swelling.    Xray staken show a well healed triple foot arthrodesis with hardware/fixation in place.    Dx:  1. Healed left foot/ankle triple arthrodesis with IF    Plan:  1. Dr. Castaneda is recommending hardware removal. Risks, benefits and possible prognosis and complications were discussed. He will schedule when he is ready to proceed.    Again, thank you for allowing me to participate in the care of your patient.      Sincerely,    Anshul Castaneda MD      "

## 2019-11-12 ENCOUNTER — PREP FOR PROCEDURE (OUTPATIENT)
Dept: ORTHOPEDICS | Facility: CLINIC | Age: 54
End: 2019-11-12

## 2019-11-12 DIAGNOSIS — Z98.1 HX OF ANKLE FUSION: Primary | ICD-10-CM

## 2019-11-12 NOTE — NURSING NOTE
Teaching Flowsheet   Relevant Diagnosis: retained hardware, left tibia, left great toe, left calcaneus  Teaching Topic: preop hardware removal Left tibia, left great toe, left calcaneus    [place].       Person(s) involved in teaching:   Patient     Motivation Level:  Asks Questions: Yes  Eager to Learn: Yes  Cooperative: Yes  Receptive (willing/able to accept information): Yes  Any cultural factors/Amish beliefs that may influence understanding or compliance? No  Comments:      Patient demonstrates understanding of the following:  Reason for the appointment, diagnosis and treatment plan: Yes  Knowledge of proper use of medications and conditions for which they are ordered (with special attention to potential side effects or drug interactions): Yes  Which situations necessitate calling provider and whom to contact: Yes       Teaching Concerns Addressed:   Comments:      Proper use and care of dressings (medical equip, care aids, etc.): Yes  Nutritional needs and diet plan: Yes  Pain management techniques: Yes  Wound Care: Yes  How and/when to access community resources: NA     Instructional Materials Used/Given: preop pkt, antiseptic soap     Time spent with patient: 15 minutes.

## 2019-11-14 ENCOUNTER — HOSPITAL ENCOUNTER (OUTPATIENT)
Facility: CLINIC | Age: 54
End: 2019-11-14
Attending: ORTHOPAEDIC SURGERY | Admitting: ORTHOPAEDIC SURGERY

## 2019-11-14 DIAGNOSIS — Z98.1 HX OF ANKLE FUSION: ICD-10-CM

## 2019-11-18 RX ORDER — CEFAZOLIN SODIUM 1 G/3ML
1 INJECTION, POWDER, FOR SOLUTION INTRAMUSCULAR; INTRAVENOUS SEE ADMIN INSTRUCTIONS
Status: CANCELLED | OUTPATIENT
Start: 2019-11-18

## 2019-11-18 RX ORDER — CEFAZOLIN SODIUM 2 G/100ML
2 INJECTION, SOLUTION INTRAVENOUS
Status: CANCELLED | OUTPATIENT
Start: 2019-11-18

## 2019-11-18 NOTE — PROGRESS NOTES
"CC: follow up L ankle fusion 10/26/2017    HPI:  Erin is seen today in follow up of his triple foot arthrodesis with internal fixation and tendon transfer. He reports doing well and has no new concerns. He has been wearing a brace \"off and on\". No new injuries falls or trauma. Rates pain \"2\".    PMH; Reviewed from patient marcel ttoday 11/11/2019    ROS: Reviewed from patient tablet today 11/11/2019 with all systems negative except for those listed below.    PE:  Pleasant and cooperative male alert and oriented x3.Foot and ankle position is ideal. Incisions are healed. No motion noted to tibiotalar or subtalar joint. No focal areas of tenderness. Minimal swelling.    Xray staken show a well healed triple foot arthrodesis with hardware/fixation in place.    Dx:  1. Healed left foot/ankle triple arthrodesis with IF    Plan:  1. Dr. Castaneda is recommending hardware removal. Risks, benefits and possible prognosis and complications were discussed. He will schedule when he is ready to proceed.  "

## 2019-11-20 ENCOUNTER — DOCUMENTATION ONLY (OUTPATIENT)
Dept: ORTHOPEDICS | Facility: CLINIC | Age: 54
End: 2019-11-20

## 2019-11-20 NOTE — PROGRESS NOTES
Patient's surgery canceled due to inability to reach patient to go over pre-op teaching. Financial securing office has also been unable to reach patient to confirm payment for the procedure.     Per Financial Counselor, patient and his relatives have been left several messages, all without response.

## 2022-08-17 NOTE — PLAN OF CARE
Problem: Patient Care Overview  Goal: Individualization & Mutuality  Pt A/O X 4. Afebrile. VSS. Lungs- Clear bilaterally with both anterior and posterior. IS encouraged. Bowels- Hyperactive in all four quadrants. CMS and Neuro's are intact. Denies numbness and tingling in all extremities. Denies nausea, shortness of breath, and chest pain. Has pain in the left leg and given 10mg Oxycodone, and is tolerating well. Voids spontaneously without difficulty in the bathroom and urinal at times, voided 300ml's and was bladder scanned 24ml's PVR @ 1125AM. Is on a Regular diet and appetite was Good this shift. Left leg dressing is within a soft cast and is C/D/I. Pt up in room with SBA and crutches. PIV removed for discharge. PCD in place on the RLE. Bilateral heels are elevated off the bed. Pt is able to make needs known and the call light is within the pt's reach. Continue to monitor.        Hemigard Retention Suture: 2-0 Nylon

## (undated) DEVICE — GLOVE PROTEXIS W/NEU-THERA 8.0  2D73TE80

## (undated) DEVICE — BLADE SAW SAGITTAL STRK 18X90X1.27MM HD SYS 6 6118-127-090

## (undated) DEVICE — PREP DURAPREP REMOVER 4OZ 8611

## (undated) DEVICE — GLOVE PROTEXIS BLUE W/NEU-THERA 7.5  2D73EB75

## (undated) DEVICE — SYR 20ML LL W/O NDL 302830

## (undated) DEVICE — DRSG STERI STRIP 1/2X4" R1547

## (undated) DEVICE — SU ETHILON 3-0 PS-1 18" 1663H

## (undated) DEVICE — LINEN ORTHO PACK 5446

## (undated) DEVICE — BNDG ELASTIC 4" DBL LENGTH UNSTERILE 6611-14

## (undated) DEVICE — SUCTION MANIFOLD DORNOCH ULTRA CART UL-CL500

## (undated) DEVICE — SYR 30ML LL W/O NDL 302832

## (undated) DEVICE — Device

## (undated) DEVICE — DRSG GAUZE 4X8"

## (undated) DEVICE — SOL NACL 0.9% IRRIG 1000ML BOTTLE 2F7124

## (undated) DEVICE — STRAP KNEE/BODY 31143004

## (undated) DEVICE — DRSG ADAPTIC 3X8" 6113

## (undated) DEVICE — DRSG ABDOMINAL 07 1/2X8" 7197D

## (undated) DEVICE — SU VICRYL 0 CT-1 27" UND J260H

## (undated) DEVICE — PACK LOWER EXTREMITY RIVERSIDE SOP32LEFSX

## (undated) DEVICE — SU ETHIBOND 1 CT-1 30" X425H

## (undated) DEVICE — PREP DURAPREP 26ML APL 8630

## (undated) DEVICE — CAST PADDING 4" COTTON WEBRIL UNSTERILE 9084

## (undated) DEVICE — DRAPE C-ARMOR 5 SIDED 5523

## (undated) DEVICE — SU MONOCRYL 3-0 PS-2 18" UND Y497G

## (undated) DEVICE — HEADREST FOAM 9" PINK

## (undated) DEVICE — DECANTER TRANSFER DEVICE 2008S

## (undated) DEVICE — TOURNIQUET CUFF 30" REPRO BLUE 60-7070-105

## (undated) DEVICE — BASIN SET MAJOR

## (undated) DEVICE — GLOVE PROTEXIS BLUE W/NEU-THERA 8.0  2D73EB80

## (undated) DEVICE — DRAPE C-ARM W/STRAPS 42X72" 07-CA104

## (undated) DEVICE — CAST PLASTER SPLINT 5X30" 7395

## (undated) DEVICE — SU VICRYL 1 CT-1 36" J347H

## (undated) DEVICE — CAST PADDING 6" STERILE 9046S

## (undated) DEVICE — DRAPE IOBAN INCISE 23X17" 6650EZ

## (undated) DEVICE — LINEN GOWN X4 5410

## (undated) DEVICE — SYR 10ML LL W/O NDL

## (undated) DEVICE — ESU GROUND PAD UNIVERSAL W/O CORD

## (undated) DEVICE — SU VICRYL 2-0 CT-2 27" UND J269H

## (undated) DEVICE — GOWN XLG DISP 9545

## (undated) DEVICE — CAST PADDING 4" STERILE 9044S

## (undated) DEVICE — SOL WATER IRRIG 1000ML BOTTLE 2F7114

## (undated) DEVICE — LINEN TOWEL PACK X5 5464

## (undated) DEVICE — BLADE KNIFE SURG 15 371115

## (undated) DEVICE — DRILL BIT QUICK COUPLING CANN 2.7MM 310.67

## (undated) DEVICE — SU MONOCRYL 2-0 SH 27" UND Y417H

## (undated) DEVICE — LIGHT HANDLE X2

## (undated) DEVICE — SUCTION TIP YANKAUER STR K87

## (undated) DEVICE — GUIDE WIRE 1.25X150MM 4.0MM THRD 900.722

## (undated) RX ORDER — EPHEDRINE SULFATE 50 MG/ML
INJECTION, SOLUTION INTRAMUSCULAR; INTRAVENOUS; SUBCUTANEOUS
Status: DISPENSED
Start: 2017-10-26

## (undated) RX ORDER — GABAPENTIN 300 MG/1
CAPSULE ORAL
Status: DISPENSED
Start: 2017-10-26

## (undated) RX ORDER — FENTANYL CITRATE 50 UG/ML
INJECTION, SOLUTION INTRAMUSCULAR; INTRAVENOUS
Status: DISPENSED
Start: 2017-10-26

## (undated) RX ORDER — CEFAZOLIN SODIUM 2 G/100ML
INJECTION, SOLUTION INTRAVENOUS
Status: DISPENSED
Start: 2017-10-26

## (undated) RX ORDER — KETOROLAC TROMETHAMINE 30 MG/ML
INJECTION, SOLUTION INTRAMUSCULAR; INTRAVENOUS
Status: DISPENSED
Start: 2017-10-26

## (undated) RX ORDER — PHENYLEPHRINE HCL IN 0.9% NACL 1 MG/10 ML
SYRINGE (ML) INTRAVENOUS
Status: DISPENSED
Start: 2017-10-26

## (undated) RX ORDER — PROPOFOL 10 MG/ML
INJECTION, EMULSION INTRAVENOUS
Status: DISPENSED
Start: 2017-10-26

## (undated) RX ORDER — ACETAMINOPHEN 325 MG/1
TABLET ORAL
Status: DISPENSED
Start: 2017-10-26

## (undated) RX ORDER — CEFAZOLIN SODIUM 1 G/3ML
INJECTION, POWDER, FOR SOLUTION INTRAMUSCULAR; INTRAVENOUS
Status: DISPENSED
Start: 2017-10-26